# Patient Record
Sex: MALE | Race: WHITE | NOT HISPANIC OR LATINO | URBAN - METROPOLITAN AREA
[De-identification: names, ages, dates, MRNs, and addresses within clinical notes are randomized per-mention and may not be internally consistent; named-entity substitution may affect disease eponyms.]

---

## 2020-12-20 ENCOUNTER — OUTPATIENT (OUTPATIENT)
Dept: OUTPATIENT SERVICES | Facility: HOSPITAL | Age: 65
LOS: 1 days | End: 2020-12-20
Payer: COMMERCIAL

## 2020-12-20 DIAGNOSIS — Z20.828 CONTACT WITH AND (SUSPECTED) EXPOSURE TO OTHER VIRAL COMMUNICABLE DISEASES: ICD-10-CM

## 2020-12-20 PROCEDURE — U0003: CPT

## 2020-12-21 DIAGNOSIS — Z20.828 CONTACT WITH AND (SUSPECTED) EXPOSURE TO OTHER VIRAL COMMUNICABLE DISEASES: ICD-10-CM

## 2020-12-21 LAB — SARS-COV-2 RNA SPEC QL NAA+PROBE: SIGNIFICANT CHANGE UP

## 2021-09-23 ENCOUNTER — EMERGENCY (EMERGENCY)
Facility: HOSPITAL | Age: 66
LOS: 0 days | Discharge: LEFT BEFORE DISCHARGE | End: 2021-09-23
Attending: EMERGENCY MEDICINE
Payer: COMMERCIAL

## 2021-09-23 VITALS
HEART RATE: 157 BPM | TEMPERATURE: 99 F | OXYGEN SATURATION: 100 % | RESPIRATION RATE: 19 BRPM | HEIGHT: 70 IN | DIASTOLIC BLOOD PRESSURE: 97 MMHG | SYSTOLIC BLOOD PRESSURE: 143 MMHG | WEIGHT: 175.05 LBS

## 2021-09-23 VITALS
OXYGEN SATURATION: 99 % | HEART RATE: 99 BPM | TEMPERATURE: 99 F | DIASTOLIC BLOOD PRESSURE: 92 MMHG | SYSTOLIC BLOOD PRESSURE: 155 MMHG | RESPIRATION RATE: 18 BRPM

## 2021-09-23 DIAGNOSIS — R00.2 PALPITATIONS: ICD-10-CM

## 2021-09-23 DIAGNOSIS — Z20.822 CONTACT WITH AND (SUSPECTED) EXPOSURE TO COVID-19: ICD-10-CM

## 2021-09-23 DIAGNOSIS — I48.92 UNSPECIFIED ATRIAL FLUTTER: ICD-10-CM

## 2021-09-23 LAB
ALBUMIN SERPL ELPH-MCNC: 4.1 G/DL — SIGNIFICANT CHANGE UP (ref 3.3–5)
ALP SERPL-CCNC: 71 U/L — SIGNIFICANT CHANGE UP (ref 40–120)
ALT FLD-CCNC: 120 U/L — HIGH (ref 12–78)
ANION GAP SERPL CALC-SCNC: 6 MMOL/L — SIGNIFICANT CHANGE UP (ref 5–17)
APTT BLD: 30.9 SEC — SIGNIFICANT CHANGE UP (ref 27.5–35.5)
AST SERPL-CCNC: 54 U/L — HIGH (ref 15–37)
BASOPHILS # BLD AUTO: 0.06 K/UL — SIGNIFICANT CHANGE UP (ref 0–0.2)
BASOPHILS NFR BLD AUTO: 0.7 % — SIGNIFICANT CHANGE UP (ref 0–2)
BILIRUB SERPL-MCNC: 0.6 MG/DL — SIGNIFICANT CHANGE UP (ref 0.2–1.2)
BUN SERPL-MCNC: 13 MG/DL — SIGNIFICANT CHANGE UP (ref 7–23)
CALCIUM SERPL-MCNC: 9.2 MG/DL — SIGNIFICANT CHANGE UP (ref 8.5–10.1)
CHLORIDE SERPL-SCNC: 103 MMOL/L — SIGNIFICANT CHANGE UP (ref 96–108)
CO2 SERPL-SCNC: 27 MMOL/L — SIGNIFICANT CHANGE UP (ref 22–31)
CREAT SERPL-MCNC: 0.89 MG/DL — SIGNIFICANT CHANGE UP (ref 0.5–1.3)
EOSINOPHIL # BLD AUTO: 0.14 K/UL — SIGNIFICANT CHANGE UP (ref 0–0.5)
EOSINOPHIL NFR BLD AUTO: 1.7 % — SIGNIFICANT CHANGE UP (ref 0–6)
GLUCOSE SERPL-MCNC: 109 MG/DL — HIGH (ref 70–99)
HCT VFR BLD CALC: 38.7 % — LOW (ref 39–50)
HGB BLD-MCNC: 13.5 G/DL — SIGNIFICANT CHANGE UP (ref 13–17)
IMM GRANULOCYTES NFR BLD AUTO: 0.9 % — SIGNIFICANT CHANGE UP (ref 0–1.5)
INR BLD: 1.02 RATIO — SIGNIFICANT CHANGE UP (ref 0.88–1.16)
LYMPHOCYTES # BLD AUTO: 1.78 K/UL — SIGNIFICANT CHANGE UP (ref 1–3.3)
LYMPHOCYTES # BLD AUTO: 22.1 % — SIGNIFICANT CHANGE UP (ref 13–44)
MAGNESIUM SERPL-MCNC: 2.1 MG/DL — SIGNIFICANT CHANGE UP (ref 1.6–2.6)
MCHC RBC-ENTMCNC: 32.1 PG — SIGNIFICANT CHANGE UP (ref 27–34)
MCHC RBC-ENTMCNC: 34.9 GM/DL — SIGNIFICANT CHANGE UP (ref 32–36)
MCV RBC AUTO: 92.1 FL — SIGNIFICANT CHANGE UP (ref 80–100)
MONOCYTES # BLD AUTO: 0.95 K/UL — HIGH (ref 0–0.9)
MONOCYTES NFR BLD AUTO: 11.8 % — SIGNIFICANT CHANGE UP (ref 2–14)
NEUTROPHILS # BLD AUTO: 5.05 K/UL — SIGNIFICANT CHANGE UP (ref 1.8–7.4)
NEUTROPHILS NFR BLD AUTO: 62.8 % — SIGNIFICANT CHANGE UP (ref 43–77)
PHOSPHATE SERPL-MCNC: 3.2 MG/DL — SIGNIFICANT CHANGE UP (ref 2.5–4.5)
PLATELET # BLD AUTO: 226 K/UL — SIGNIFICANT CHANGE UP (ref 150–400)
POTASSIUM SERPL-MCNC: 4 MMOL/L — SIGNIFICANT CHANGE UP (ref 3.5–5.3)
POTASSIUM SERPL-SCNC: 4 MMOL/L — SIGNIFICANT CHANGE UP (ref 3.5–5.3)
PROT SERPL-MCNC: 7.2 GM/DL — SIGNIFICANT CHANGE UP (ref 6–8.3)
PROTHROM AB SERPL-ACNC: 11.8 SEC — SIGNIFICANT CHANGE UP (ref 10.6–13.6)
RAPID RVP RESULT: DETECTED
RBC # BLD: 4.2 M/UL — SIGNIFICANT CHANGE UP (ref 4.2–5.8)
RBC # FLD: 12.9 % — SIGNIFICANT CHANGE UP (ref 10.3–14.5)
RV+EV RNA SPEC QL NAA+PROBE: DETECTED
SARS-COV-2 RNA SPEC QL NAA+PROBE: SIGNIFICANT CHANGE UP
SODIUM SERPL-SCNC: 136 MMOL/L — SIGNIFICANT CHANGE UP (ref 135–145)
TROPONIN I SERPL-MCNC: <0.015 NG/ML — SIGNIFICANT CHANGE UP (ref 0.01–0.04)
WBC # BLD: 8.05 K/UL — SIGNIFICANT CHANGE UP (ref 3.8–10.5)
WBC # FLD AUTO: 8.05 K/UL — SIGNIFICANT CHANGE UP (ref 3.8–10.5)

## 2021-09-23 PROCEDURE — 83735 ASSAY OF MAGNESIUM: CPT

## 2021-09-23 PROCEDURE — 80053 COMPREHEN METABOLIC PANEL: CPT

## 2021-09-23 PROCEDURE — 71045 X-RAY EXAM CHEST 1 VIEW: CPT

## 2021-09-23 PROCEDURE — 96374 THER/PROPH/DIAG INJ IV PUSH: CPT

## 2021-09-23 PROCEDURE — 93005 ELECTROCARDIOGRAM TRACING: CPT

## 2021-09-23 PROCEDURE — 84100 ASSAY OF PHOSPHORUS: CPT

## 2021-09-23 PROCEDURE — 0225U NFCT DS DNA&RNA 21 SARSCOV2: CPT

## 2021-09-23 PROCEDURE — 99285 EMERGENCY DEPT VISIT HI MDM: CPT

## 2021-09-23 PROCEDURE — 85730 THROMBOPLASTIN TIME PARTIAL: CPT

## 2021-09-23 PROCEDURE — 71045 X-RAY EXAM CHEST 1 VIEW: CPT | Mod: 26

## 2021-09-23 PROCEDURE — 96361 HYDRATE IV INFUSION ADD-ON: CPT

## 2021-09-23 PROCEDURE — 96375 TX/PRO/DX INJ NEW DRUG ADDON: CPT

## 2021-09-23 PROCEDURE — 84443 ASSAY THYROID STIM HORMONE: CPT

## 2021-09-23 PROCEDURE — 85025 COMPLETE CBC W/AUTO DIFF WBC: CPT

## 2021-09-23 PROCEDURE — 36415 COLL VENOUS BLD VENIPUNCTURE: CPT

## 2021-09-23 PROCEDURE — 85610 PROTHROMBIN TIME: CPT

## 2021-09-23 PROCEDURE — 84484 ASSAY OF TROPONIN QUANT: CPT

## 2021-09-23 PROCEDURE — 93010 ELECTROCARDIOGRAM REPORT: CPT

## 2021-09-23 PROCEDURE — 99284 EMERGENCY DEPT VISIT MOD MDM: CPT | Mod: 25

## 2021-09-23 RX ORDER — APIXABAN 2.5 MG/1
5 TABLET, FILM COATED ORAL ONCE
Refills: 0 | Status: COMPLETED | OUTPATIENT
Start: 2021-09-23 | End: 2021-09-23

## 2021-09-23 RX ORDER — DILTIAZEM HCL 120 MG
10 CAPSULE, EXT RELEASE 24 HR ORAL ONCE
Refills: 0 | Status: COMPLETED | OUTPATIENT
Start: 2021-09-23 | End: 2021-09-23

## 2021-09-23 RX ORDER — SODIUM CHLORIDE 9 MG/ML
2000 INJECTION INTRAMUSCULAR; INTRAVENOUS; SUBCUTANEOUS ONCE
Refills: 0 | Status: COMPLETED | OUTPATIENT
Start: 2021-09-23 | End: 2021-09-23

## 2021-09-23 RX ORDER — ASPIRIN/CALCIUM CARB/MAGNESIUM 324 MG
324 TABLET ORAL ONCE
Refills: 0 | Status: COMPLETED | OUTPATIENT
Start: 2021-09-23 | End: 2021-09-23

## 2021-09-23 RX ORDER — DILTIAZEM HCL 120 MG
1 CAPSULE, EXT RELEASE 24 HR ORAL
Qty: 14 | Refills: 0
Start: 2021-09-23 | End: 2021-10-06

## 2021-09-23 RX ORDER — DILTIAZEM HCL 120 MG
120 CAPSULE, EXT RELEASE 24 HR ORAL ONCE
Refills: 0 | Status: COMPLETED | OUTPATIENT
Start: 2021-09-23 | End: 2021-09-23

## 2021-09-23 RX ORDER — APIXABAN 2.5 MG/1
1 TABLET, FILM COATED ORAL
Qty: 28 | Refills: 0
Start: 2021-09-23 | End: 2021-10-06

## 2021-09-23 RX ADMIN — SODIUM CHLORIDE 2000 MILLILITER(S): 9 INJECTION INTRAMUSCULAR; INTRAVENOUS; SUBCUTANEOUS at 17:16

## 2021-09-23 RX ADMIN — APIXABAN 5 MILLIGRAM(S): 2.5 TABLET, FILM COATED ORAL at 19:58

## 2021-09-23 RX ADMIN — Medication 10 MILLIGRAM(S): at 16:15

## 2021-09-23 RX ADMIN — Medication 10 MILLIGRAM(S): at 16:24

## 2021-09-23 RX ADMIN — SODIUM CHLORIDE 2000 MILLILITER(S): 9 INJECTION INTRAMUSCULAR; INTRAVENOUS; SUBCUTANEOUS at 16:15

## 2021-09-23 RX ADMIN — Medication 324 MILLIGRAM(S): at 16:14

## 2021-09-23 RX ADMIN — Medication 120 MILLIGRAM(S): at 16:38

## 2021-09-23 NOTE — ED PROVIDER NOTE - PATIENT PORTAL LINK FT
You can access the FollowMyHealth Patient Portal offered by [facility] by registering at the following website: [email or web address]. By joining [employer]’s FollowMyHealth portal, you will also be able to view your health information using other applications (apps) compatible with our system.

## 2021-09-23 NOTE — ED STATDOCS - PROGRESS NOTE DETAILS
Tracy Sparks: 64 y/o M with no significant PMHx presents to the ED c/o elevated HR. Last night went to get a rapid COVID test due to having a sore throat. Got his HR taken and got an EKG done that showed an abnormality. COVID test, result negative. PMD recommended pt to come in to the ED. Denies having any pain. Denies feeling lightheaded. No daily meds. No cardiac hx.  PMD: Dr. Hernandez.  Will send pt to main ED for further evaluation.

## 2021-09-23 NOTE — ED PROVIDER NOTE - PHYSICAL EXAMINATION
*GEN: No acute distress, well appearing   *HEAD: Normocephalic, Atraumatic  *EYES/NOSE: b/l Pupils symmetric & Reactive to ligth, EOMI b/l  *THROAT: airway patent, moist mucous membranes  *NECK: Neck supple  *PULMONARY: No Respiratory distress, symmetric b/l chest rise  *CARDIAC: s1s2, regular rhythm, tachycardic  *ABDOMEN:  Non Tender, Non Distended, soft, no guarding, no rebound, no masses   *BACK: no CVA tenderness, No midline vertebral tenderness to palpation   *EXTREMITIES: symmetric pulses, 2+ DP & radial pulses, no cyanosis, no edema   *SKIN: no rash, no bruising   *NEUROLOGIC: alert,  full active & passive ROM in all 4 extremities,   *PSYCH: appropriate concern about symptoms, pleasant

## 2021-09-23 NOTE — ED PROVIDER NOTE - NSFOLLOWUPINSTRUCTIONS_ED_ALL_ED_FT
FOLLOW UP WITH PMD & CARDIOLOGIST DR TAY  WITHIN 1-2DAYS, CALL TO MAKE APPOINTMENT  COME BACK TO ED IF YOUR CONDITION WORSENS OR IF YOU DEVELOP FEVER GREATER THAN 100.4F, CHEST PAIN,  SHORTNESS OF BREATH OR ANY OTHER SYMPTOMS CONCERNING TO YOU  TAKE TYLENOL (ACETAMINOPHEN) 650 MG EVERY 6 HOURS AS NEEDED FOR PAIN    IF YOU WERE PRESRCIBED ANY MEDICATIONS FROM TODAY'S VISIT, TAKE THEM AS DIRECTED (eliquis, cardizem)    A-fib (Atrial Fibrillation)    WHAT YOU NEED TO KNOW:    A-fib may come and go, or it may be a long-term condition. A-fib can cause blood clots, stroke, or heart failure. These conditions may become life-threatening. It is important to treat and manage a-fib to help prevent a blood clot, stroke, or heart failure. Heart Chambers         DISCHARGE INSTRUCTIONS:    Call 911 for any of the following:     You have any of the following signs of a heart attack:   Squeezing, pressure, or pain in your chest       and any of the following:   Discomfort or pain in your back, neck, jaw, stomach, or arm       Shortness of breath      Nausea or vomiting      Lightheadedness or a sudden cold sweat      You have any of the following signs of a stroke:   Numbness or drooping on one side of your face       Weakness in an arm or leg      Confusion or difficulty speaking      Dizziness, a severe headache, or vision loss    Return to the emergency department if: You have any of the following signs of a blood clot:     You feel lightheaded, are short of breath, and have chest pain.      You cough up blood.      You have swelling, redness, pain, or warmth in your arm or leg.    Contact your cardiologist or healthcare provider if:     Your heart rate is more than 110 beats per minute.      You have new or worsening swelling in your legs, feet, ankles, or abdomen.       You are short of breath, even at rest.       You have questions or concerns about your condition or care.     Medicines: You may need any of the following:     Heart medicines help control your heart rate or rhythm. You may need more than one medicine to treat your symptoms.       Blood thinners help prevent blood clots. Examples of blood thinners include heparin and warfarin. Clots can cause strokes, heart attacks, and death. The following are general safety guidelines to follow while you are taking a blood thinner:  Watch for bleeding and bruising while you take blood thinners. Watch for bleeding from your gums or nose. Watch for blood in your urine and bowel movements. Use a soft washcloth on your skin, and a soft toothbrush to brush your teeth. This can keep your skin and gums from bleeding. If you shave, use an electric shaver. Do not play contact sports.       Tell your dentist and other healthcare providers that you take anticoagulants. Wear a bracelet or necklace that says you take this medicine.       Do not start or stop any medicines unless your healthcare provider tells you to. Many medicines cannot be used with blood thinners.       Tell your healthcare provider right away if you forget to take the medicine, or if you take too much.      Warfarin is a blood thinner that you may need to take. The following are things you should be aware of if you take warfarin:   Foods and medicines can affect the amount of warfarin in your blood. Do not make major changes to your diet while you take warfarin. Warfarin works best when you eat about the same amount of vitamin K every day. Vitamin K is found in green leafy vegetables and certain other foods. Ask for more information about what to eat when you are taking warfarin.      You will need to see your healthcare provider for follow-up visits when you are on warfarin. You will need regular blood tests. These tests are used to decide how much medicine you need.       Antiplatelets, such as aspirin, help prevent blood clots. Take your antiplatelet medicine exactly as directed. These medicines make it more likely for you to bleed or bruise. If you are told to take aspirin, do not take acetaminophen or ibuprofen instead.       Take your medicine as directed. Contact your healthcare provider if you think your medicine is not helping or if you have side effects. Tell him or her if you are allergic to any medicine. Keep a list of the medicines, vitamins, and herbs you take. Include the amounts, and when and why you take them. Bring the list or the pill bottles to follow-up visits. Carry your medicine list with you in case of an emergency.    Follow up with your cardiologist as directed: You will need regular blood tests and monitoring. Write down your questions so you remember to ask them during your visits.     Manage A-fib:     Know your target heart rate. Learn how to take your pulse and monitor your heart rate.      Manage other health conditions. This includes high blood pressure, sleep apnea, thyroid disease, diabetes, and other heart conditions. Take medicine as directed and follow your treatment plan.       Limit or do not drink alcohol. Alcohol can make a-fib hard to manage. Ask your healthcare provider if it is safe for you to drink alcohol. A drink of alcohol is 12 ounces of beer, 5 ounces of wine, or 1½ ounces of liquor.       Do not smoke. Nicotine and other chemicals in cigarettes and cigars can cause heart and lung damage. Ask your healthcare provider for information if you currently smoke and need help to quit. E-cigarettes or smokeless tobacco still contain nicotine. Talk to your healthcare provider before you use these products.       Eat heart-healthy foods. Heart healthy foods will help keep your cholesterol low. These include fruits, vegetables, whole-grain breads, low-fat dairy products, beans, lean meats, and fish. Replace butter and margarine with heart-healthy oils such as olive oil and canola oil.       Maintain a healthy weight. Ask your healthcare provider how much you should weigh. Ask him or her to help you create a weight loss plan if you are overweight.       Exercise for 30 minutes most days of the week. Ask your healthcare provider about the best exercise plan for you.

## 2021-09-23 NOTE — ED PROVIDER NOTE - OBJECTIVE STATEMENT
Pertinent HPI/PMH/PSH/FHx/SHx and Review of Systems contained within  HPI:  Patient p/w CC  palpitations x 1 days, new onset. ekg yesterday at  showed aflutter, today showed svt.  Last night went to get a rapid COVID test due to having a sore throat. Got his HR taken and got an EKG done that showed an abnormality. COVID test, result negative. PMD recommended pt to come in to the ED. Denies having any pain. Denies feeling lightheaded. No daily meds. No cardiac hx.  PMD: Dr. Hernandez  PMH/PSH relevant for: none  ROS negative for: fever, Chest pain, SOB, Nausea, vomiting, diarrhea, abdominal pain, dysuria    FamilyHx and SocialHx not otherwise contributory

## 2021-09-23 NOTE — ED PROVIDER NOTE - NS ED ROS FT
Review of Systems:  	•	CONSTITUTIONAL: no fever  	•	SKIN: no rash  	•	RESPIRATORY: no shortness of breath  	•	CARDIAC: no chest pain, + palpitations  	•	GI:  no abd pain, no nausea, no vomiting, no diarrhea  	•	GENITO-URINARY:  no dysuria  	•	MUSCULOSKELETAL:  no back pain  	•	NEUROLOGIC: no weakness  	•	ALLERGY: no rhinorrhea  	•	PSYSCHIATRIC: appropriate concern about symptoms

## 2021-09-23 NOTE — ED ADULT NURSE REASSESSMENT NOTE - NS ED NURSE REASSESS COMMENT FT1
pt with new onset atrial fibrillation. plan of care recommended by MD Humphries is for pt to be admitted to hospital. pt does not wish to be admitted. states he would like to go home and will follow up with cardiologist. pt is A&Ox4, demonstrates competence to make medical decisions. verbalizes understanding of risks and that he may return to ED at anytime. pt educated on follow up care and outpatient medications.

## 2021-09-23 NOTE — ED PROVIDER NOTE - CARE PROVIDER_API CALL
Alexandre Oseguera)  Cardiovascular Disease; Internal Medicine; Nuclear Cardiology  [address], [address]  [place] [address]  Phone: [phone]  Fax: [phone]  Follow Up Time:     Priyank Yuan)  Cardiology; Interventional Cardiology  [address] [place] [address]  Phone: [phone]  Fax: [phone]  Follow Up Time:

## 2021-09-23 NOTE — ED PROVIDER NOTE - PROGRESS NOTE DETAILS
Tracy MCMILLAN for ED attending, Dr. Humphries: labs non-actionable, pt adamantly does not want to stay in hospital. Pt is a  has decision making capacity states he will f/u cardio outpatient agrees to second troponin understands risk/benefits. Will discuss outpatient management w/ on call cardio. Tracy MCMILLAN for ED attending, Dr. Humphries: Discussed w/ Dr. Yuan who recommends putting pt on Eliquis and [id number] qd. minerva: At the time of discharge this patient demonstrated full faculties medical capacity and judgement.  In my conversation with this patient they demonstrated the followin. This patient demonstrated their ability to express and communicate their choice to leave the emergency department against medical advice and to refuse further medical care.  2. This patient demonstrated the ability to understand relevant information regarding their diagnosis including the purpose of recommended treatment for their stated medical condition.  The paitent remembered this information and demonstrated that they were part of the decision making process in the cousre of their care.  3. This patient appreciated the significance of their medical condition, their diagnosis, and the consequences for leaving the emergency department against medical advice and refusing further medical treatment.  This patient demonstrated clear understanding of the benefits of further evaluation and treatment.  This patient demonstrated clear understanding of the risks of leaving against medical advice and refusing further medical treatment that include injury, illness, permenant disability, and death.   4.  This patient demonstrated the ability to manipulate inforamtion regarding their medical condition, their decision to leave the emergency department against medical advice, and their decision to refuse further medical care.  The patient demonstrated appropriate reasoning and intact logical thought processes during our conversation and was able to weigh the risks and benefits of receiving further medical care.

## 2021-09-23 NOTE — ED ADULT NURSE NOTE - OBJECTIVE STATEMENT
pt arrives to ED complaining of palpitations. pt sent in by his MD for tachycardia. denies medical history. denies chest pain, sob. alert and oriented x 4.

## 2021-09-23 NOTE — ED ADULT NURSE REASSESSMENT NOTE - NS ED NURSE REASSESS COMMENT FT1
pt recommended admission into hospital for new onset afib. pt denies admission. pt was explained to risks of leaving with new onset afib by MD Humphries and nurse. risks include blood clot, cardiac arrest, and death. pt understands these risks and still wants to leave.

## 2021-09-23 NOTE — ED ADULT NURSE NOTE - NSIMPLEMENTINTERV_GEN_ALL_ED
Implemented All Universal Safety Interventions:  [place] to call system. Call bell, personal items and telephone within reach. Instruct patient to call for assistance. Room bathroom lighting operational. Non-slip footwear when patient is off stretcher. Physically safe environment: no spills, clutter or unnecessary equipment. Stretcher in lowest position, wheels locked, appropriate side rails in place.

## 2022-10-10 NOTE — ED ADULT NURSE NOTE - PAIN RATING/NUMBER SCALE (0-10): ACTIVITY
[FreeTextEntry3] : IJustino MD, personally saw and evaluated the patient and developed the plan as documented above. I concur or have edited the note as appropriate.
0

## 2023-11-10 PROBLEM — Z00.00 ENCOUNTER FOR PREVENTIVE HEALTH EXAMINATION: Status: ACTIVE | Noted: 2023-11-10

## 2023-12-08 ENCOUNTER — NON-APPOINTMENT (OUTPATIENT)
Age: 68
End: 2023-12-08

## 2023-12-08 ENCOUNTER — APPOINTMENT (OUTPATIENT)
Dept: ORTHOPEDIC SURGERY | Facility: CLINIC | Age: 68
End: 2023-12-08
Payer: MEDICARE

## 2023-12-08 VITALS
SYSTOLIC BLOOD PRESSURE: 170 MMHG | HEIGHT: 75 IN | DIASTOLIC BLOOD PRESSURE: 101 MMHG | HEART RATE: 55 BPM | BODY MASS INDEX: 24.25 KG/M2 | WEIGHT: 195 LBS

## 2023-12-08 DIAGNOSIS — M16.11 UNILATERAL PRIMARY OSTEOARTHRITIS, RIGHT HIP: ICD-10-CM

## 2023-12-08 PROCEDURE — 99245 OFF/OP CONSLTJ NEW/EST HI 55: CPT

## 2023-12-08 PROCEDURE — 73502 X-RAY EXAM HIP UNI 2-3 VIEWS: CPT | Mod: RT

## 2023-12-08 PROCEDURE — 99205 OFFICE O/P NEW HI 60 MIN: CPT

## 2023-12-11 ENCOUNTER — APPOINTMENT (OUTPATIENT)
Dept: ORTHOPEDIC SURGERY | Facility: CLINIC | Age: 68
End: 2023-12-11

## 2024-01-25 ENCOUNTER — OUTPATIENT (OUTPATIENT)
Dept: OUTPATIENT SERVICES | Facility: HOSPITAL | Age: 69
LOS: 1 days | End: 2024-01-25
Payer: MEDICARE

## 2024-01-25 VITALS
OXYGEN SATURATION: 99 % | WEIGHT: 209 LBS | TEMPERATURE: 98 F | HEIGHT: 75 IN | HEART RATE: 60 BPM | RESPIRATION RATE: 14 BRPM | SYSTOLIC BLOOD PRESSURE: 130 MMHG | DIASTOLIC BLOOD PRESSURE: 80 MMHG

## 2024-01-25 DIAGNOSIS — M16.11 UNILATERAL PRIMARY OSTEOARTHRITIS, RIGHT HIP: ICD-10-CM

## 2024-01-25 DIAGNOSIS — Z98.890 OTHER SPECIFIED POSTPROCEDURAL STATES: Chronic | ICD-10-CM

## 2024-01-25 DIAGNOSIS — Z01.818 ENCOUNTER FOR OTHER PREPROCEDURAL EXAMINATION: ICD-10-CM

## 2024-01-25 LAB
A1C WITH ESTIMATED AVERAGE GLUCOSE RESULT: 5.2 % — SIGNIFICANT CHANGE UP (ref 4–5.6)
ALBUMIN SERPL ELPH-MCNC: 4.1 G/DL — SIGNIFICANT CHANGE UP (ref 3.3–5)
ALP SERPL-CCNC: 51 U/L — SIGNIFICANT CHANGE UP (ref 30–120)
ALT FLD-CCNC: 25 U/L — SIGNIFICANT CHANGE UP (ref 10–60)
ANION GAP SERPL CALC-SCNC: 9 MMOL/L — SIGNIFICANT CHANGE UP (ref 5–17)
APTT BLD: 49.8 SEC — HIGH (ref 24.5–35.6)
AST SERPL-CCNC: 25 U/L — SIGNIFICANT CHANGE UP (ref 10–40)
BILIRUB SERPL-MCNC: 0.7 MG/DL — SIGNIFICANT CHANGE UP (ref 0.2–1.2)
BLD GP AB SCN SERPL QL: SIGNIFICANT CHANGE UP
BUN SERPL-MCNC: 11 MG/DL — SIGNIFICANT CHANGE UP (ref 7–23)
CALCIUM SERPL-MCNC: 9.6 MG/DL — SIGNIFICANT CHANGE UP (ref 8.4–10.5)
CHLORIDE SERPL-SCNC: 100 MMOL/L — SIGNIFICANT CHANGE UP (ref 96–108)
CO2 SERPL-SCNC: 26 MMOL/L — SIGNIFICANT CHANGE UP (ref 22–31)
CREAT SERPL-MCNC: 0.6 MG/DL — SIGNIFICANT CHANGE UP (ref 0.5–1.3)
EGFR: 104 ML/MIN/1.73M2 — SIGNIFICANT CHANGE UP
ESTIMATED AVERAGE GLUCOSE: 103 MG/DL — SIGNIFICANT CHANGE UP (ref 68–114)
GLUCOSE SERPL-MCNC: 115 MG/DL — HIGH (ref 70–99)
HCT VFR BLD CALC: 36.7 % — LOW (ref 39–50)
HGB BLD-MCNC: 13.1 G/DL — SIGNIFICANT CHANGE UP (ref 13–17)
INR BLD: 1.31 RATIO — HIGH (ref 0.85–1.18)
MCHC RBC-ENTMCNC: 32.8 PG — SIGNIFICANT CHANGE UP (ref 27–34)
MCHC RBC-ENTMCNC: 35.7 GM/DL — SIGNIFICANT CHANGE UP (ref 32–36)
MCV RBC AUTO: 92 FL — SIGNIFICANT CHANGE UP (ref 80–100)
MRSA PCR RESULT.: SIGNIFICANT CHANGE UP
NRBC # BLD: 0 /100 WBCS — SIGNIFICANT CHANGE UP (ref 0–0)
PLATELET # BLD AUTO: 196 K/UL — SIGNIFICANT CHANGE UP (ref 150–400)
POTASSIUM SERPL-MCNC: 4.9 MMOL/L — SIGNIFICANT CHANGE UP (ref 3.5–5.3)
POTASSIUM SERPL-SCNC: 4.9 MMOL/L — SIGNIFICANT CHANGE UP (ref 3.5–5.3)
PROT SERPL-MCNC: 7.2 G/DL — SIGNIFICANT CHANGE UP (ref 6–8.3)
PROTHROM AB SERPL-ACNC: 14.2 SEC — HIGH (ref 9.5–13)
RBC # BLD: 3.99 M/UL — LOW (ref 4.2–5.8)
RBC # FLD: 13.1 % — SIGNIFICANT CHANGE UP (ref 10.3–14.5)
S AUREUS DNA NOSE QL NAA+PROBE: DETECTED
SODIUM SERPL-SCNC: 135 MMOL/L — SIGNIFICANT CHANGE UP (ref 135–145)
WBC # BLD: 5.49 K/UL — SIGNIFICANT CHANGE UP (ref 3.8–10.5)
WBC # FLD AUTO: 5.49 K/UL — SIGNIFICANT CHANGE UP (ref 3.8–10.5)

## 2024-01-25 PROCEDURE — G0463: CPT

## 2024-01-25 NOTE — H&P PST ADULT - HISTORY OF PRESENT ILLNESS
This nena 67 y/o female who presents with progressively worsening right hip pain and stiffness for the past several monthes . pain exacerbates with walking , standing and stairs with pain scale 7/10 . scheduled for  This is a 67 y/o female who presents with progressively worsening right hip pain and stiffness for the past several months . pain exacerbates with walking , standing and stairs with pain scale 7/10 . scheduled for right hip replacement on 2/14/24

## 2024-01-25 NOTE — H&P PST ADULT - MS GEN HX ROS MEA POS PC
right hip/arthritis/joint swelling/joint pain right hip/arthritis/joint swelling/joint pain/stiffness

## 2024-01-25 NOTE — H&P PST ADULT - NSICDXPASTMEDICALHX_GEN_ALL_CORE_FT
PAST MEDICAL HISTORY:  HTN (hypertension)     Osteoarthritis of right hip     Paroxysmal atrial fibrillation

## 2024-01-25 NOTE — H&P PST ADULT - GASTROINTESTINAL
nontender/nondistended/normal active bowel sounds negative soft/nontender/nondistended/normal active bowel sounds

## 2024-01-25 NOTE — H&P PST ADULT - MUSCULOSKELETAL
right hip/decreased ROM due to pain/decreased strength details… right hip/no calf tenderness/decreased ROM due to pain/decreased strength

## 2024-01-25 NOTE — H&P PST ADULT - NSICDXFAMILYHX_GEN_ALL_CORE_FT
FAMILY HISTORY:  Father  Still living? No  FH: heart attack, Age at diagnosis: Age Unknown    Sibling  Still living? Yes, Estimated age: 61-70  Family history of CLL in remission, Age at diagnosis: Age Unknown

## 2024-01-25 NOTE — H&P PST ADULT - PROBLEM SELECTOR PLAN 1
scheduled for right hip replacement on 2/14/24  will obtain medical and cardiac clearance   pre op instructions on wash and medications. instructed to take propanolol on DOS   Follow up with cardiology on Xarelto instruction for upcoming surgery.

## 2024-01-26 RX ORDER — MUPIROCIN 20 MG/G
1 OINTMENT TOPICAL
Qty: 1 | Refills: 0
Start: 2024-01-26 | End: 2024-01-30

## 2024-02-14 ENCOUNTER — TRANSCRIPTION ENCOUNTER (OUTPATIENT)
Age: 69
End: 2024-02-14

## 2024-02-14 ENCOUNTER — APPOINTMENT (OUTPATIENT)
Dept: ORTHOPEDIC SURGERY | Facility: HOSPITAL | Age: 69
End: 2024-02-14

## 2024-02-14 ENCOUNTER — INPATIENT (INPATIENT)
Facility: HOSPITAL | Age: 69
LOS: 0 days | Discharge: HOME | DRG: 470 | End: 2024-02-15
Attending: ORTHOPAEDIC SURGERY | Admitting: ORTHOPAEDIC SURGERY
Payer: COMMERCIAL

## 2024-02-14 VITALS
HEIGHT: 75 IN | OXYGEN SATURATION: 100 % | TEMPERATURE: 98 F | HEART RATE: 77 BPM | WEIGHT: 204.81 LBS | DIASTOLIC BLOOD PRESSURE: 89 MMHG | SYSTOLIC BLOOD PRESSURE: 167 MMHG | RESPIRATION RATE: 16 BRPM

## 2024-02-14 DIAGNOSIS — Z98.890 OTHER SPECIFIED POSTPROCEDURAL STATES: Chronic | ICD-10-CM

## 2024-02-14 DIAGNOSIS — M16.11 UNILATERAL PRIMARY OSTEOARTHRITIS, RIGHT HIP: ICD-10-CM

## 2024-02-14 PROBLEM — I48.0 PAROXYSMAL ATRIAL FIBRILLATION: Chronic | Status: ACTIVE | Noted: 2024-01-25

## 2024-02-14 LAB
ABO RH CONFIRMATION: SIGNIFICANT CHANGE UP
APTT BLD: 33 SEC — SIGNIFICANT CHANGE UP (ref 24.5–35.6)
INR BLD: 1.06 RATIO — SIGNIFICANT CHANGE UP (ref 0.85–1.18)
PROTHROM AB SERPL-ACNC: 11.5 SEC — SIGNIFICANT CHANGE UP (ref 9.5–13)

## 2024-02-14 PROCEDURE — 99221 1ST HOSP IP/OBS SF/LOW 40: CPT

## 2024-02-14 PROCEDURE — 27130 TOTAL HIP ARTHROPLASTY: CPT | Mod: RT

## 2024-02-14 PROCEDURE — 27130 TOTAL HIP ARTHROPLASTY: CPT | Mod: AS,RT

## 2024-02-14 DEVICE — BONE WAX 2.5GM: Type: IMPLANTABLE DEVICE | Site: RIGHT | Status: FUNCTIONAL

## 2024-02-14 DEVICE — K-WIRE MICROAIRE (THREADED) SINGLE TROCAR 4.8MM X 9": Type: IMPLANTABLE DEVICE | Site: RIGHT | Status: FUNCTIONAL

## 2024-02-14 DEVICE — CUP ACET EMPOWR CLUSTER 62MM ALPHA I: Type: IMPLANTABLE DEVICE | Site: RIGHT | Status: FUNCTIONAL

## 2024-02-14 DEVICE — SCREW ACET EMPOWR 6.5X35MM: Type: IMPLANTABLE DEVICE | Site: RIGHT | Status: FUNCTIONAL

## 2024-02-14 DEVICE — LINER ACET EMPOWR HXE PLUS 4X40MM ALPHA I: Type: IMPLANTABLE DEVICE | Site: RIGHT | Status: FUNCTIONAL

## 2024-02-14 DEVICE — IMPLANTABLE DEVICE: Type: IMPLANTABLE DEVICE | Site: RIGHT | Status: FUNCTIONAL

## 2024-02-14 DEVICE — SLEEVE BIOLOX DELTA OPTION NEUTRAL: Type: IMPLANTABLE DEVICE | Site: RIGHT | Status: FUNCTIONAL

## 2024-02-14 DEVICE — HEAD FEM OPTION CERAMIC DELTA 40MM: Type: IMPLANTABLE DEVICE | Site: RIGHT | Status: FUNCTIONAL

## 2024-02-14 RX ORDER — POLYETHYLENE GLYCOL 3350 17 G/17G
17 POWDER, FOR SOLUTION ORAL AT BEDTIME
Refills: 0 | Status: DISCONTINUED | OUTPATIENT
Start: 2024-02-14 | End: 2024-02-15

## 2024-02-14 RX ORDER — HYDROMORPHONE HYDROCHLORIDE 2 MG/ML
0.2 INJECTION INTRAMUSCULAR; INTRAVENOUS; SUBCUTANEOUS
Refills: 0 | Status: DISCONTINUED | OUTPATIENT
Start: 2024-02-14 | End: 2024-02-14

## 2024-02-14 RX ORDER — ACETAMINOPHEN 500 MG
1000 TABLET ORAL ONCE
Refills: 0 | Status: COMPLETED | OUTPATIENT
Start: 2024-02-14 | End: 2024-02-14

## 2024-02-14 RX ORDER — CELECOXIB 200 MG/1
1 CAPSULE ORAL
Qty: 60 | Refills: 0
Start: 2024-02-14 | End: 2024-03-14

## 2024-02-14 RX ORDER — GLYCERIN 1 %
1 DROPS OPHTHALMIC (EYE) EVERY 6 HOURS
Refills: 0 | Status: DISCONTINUED | OUTPATIENT
Start: 2024-02-14 | End: 2024-02-15

## 2024-02-14 RX ORDER — CHLORHEXIDINE GLUCONATE 213 G/1000ML
1 SOLUTION TOPICAL ONCE
Refills: 0 | Status: COMPLETED | OUTPATIENT
Start: 2024-02-14 | End: 2024-02-14

## 2024-02-14 RX ORDER — APIXABAN 2.5 MG/1
2.5 TABLET, FILM COATED ORAL EVERY 12 HOURS
Refills: 0 | Status: DISCONTINUED | OUTPATIENT
Start: 2024-02-15 | End: 2024-02-15

## 2024-02-14 RX ORDER — PROPRANOLOL HCL 160 MG
1 CAPSULE, EXTENDED RELEASE 24HR ORAL
Refills: 0 | DISCHARGE

## 2024-02-14 RX ORDER — CELECOXIB 200 MG/1
100 CAPSULE ORAL EVERY 12 HOURS
Refills: 0 | Status: DISCONTINUED | OUTPATIENT
Start: 2024-02-14 | End: 2024-02-15

## 2024-02-14 RX ORDER — ACETAMINOPHEN 500 MG
1000 TABLET ORAL EVERY 8 HOURS
Refills: 0 | Status: DISCONTINUED | OUTPATIENT
Start: 2024-02-14 | End: 2024-02-15

## 2024-02-14 RX ORDER — SODIUM CHLORIDE 9 MG/ML
500 INJECTION INTRAMUSCULAR; INTRAVENOUS; SUBCUTANEOUS ONCE
Refills: 0 | Status: COMPLETED | OUTPATIENT
Start: 2024-02-14 | End: 2024-02-14

## 2024-02-14 RX ORDER — CEFAZOLIN SODIUM 1 G
2000 VIAL (EA) INJECTION EVERY 8 HOURS
Refills: 0 | Status: COMPLETED | OUTPATIENT
Start: 2024-02-14 | End: 2024-02-14

## 2024-02-14 RX ORDER — HYDROMORPHONE HYDROCHLORIDE 2 MG/ML
0.5 INJECTION INTRAMUSCULAR; INTRAVENOUS; SUBCUTANEOUS
Refills: 0 | Status: DISCONTINUED | OUTPATIENT
Start: 2024-02-14 | End: 2024-02-15

## 2024-02-14 RX ORDER — OXYCODONE HYDROCHLORIDE 5 MG/1
1 TABLET ORAL
Qty: 42 | Refills: 0
Start: 2024-02-14

## 2024-02-14 RX ORDER — PANTOPRAZOLE SODIUM 20 MG/1
40 TABLET, DELAYED RELEASE ORAL
Refills: 0 | Status: DISCONTINUED | OUTPATIENT
Start: 2024-02-14 | End: 2024-02-15

## 2024-02-14 RX ORDER — OXYCODONE HYDROCHLORIDE 5 MG/1
10 TABLET ORAL
Refills: 0 | Status: DISCONTINUED | OUTPATIENT
Start: 2024-02-14 | End: 2024-02-15

## 2024-02-14 RX ORDER — DEXAMETHASONE 0.5 MG/5ML
8 ELIXIR ORAL ONCE
Refills: 0 | Status: COMPLETED | OUTPATIENT
Start: 2024-02-15 | End: 2024-02-15

## 2024-02-14 RX ORDER — APIXABAN 2.5 MG/1
1 TABLET, FILM COATED ORAL
Qty: 28 | Refills: 0
Start: 2024-02-14 | End: 2024-02-27

## 2024-02-14 RX ORDER — ONDANSETRON 8 MG/1
4 TABLET, FILM COATED ORAL EVERY 6 HOURS
Refills: 0 | Status: DISCONTINUED | OUTPATIENT
Start: 2024-02-14 | End: 2024-02-15

## 2024-02-14 RX ORDER — OMEPRAZOLE 10 MG/1
1 CAPSULE, DELAYED RELEASE ORAL
Qty: 30 | Refills: 0
Start: 2024-02-14

## 2024-02-14 RX ORDER — SENNA PLUS 8.6 MG/1
2 TABLET ORAL AT BEDTIME
Refills: 0 | Status: DISCONTINUED | OUTPATIENT
Start: 2024-02-14 | End: 2024-02-15

## 2024-02-14 RX ORDER — POLYETHYLENE GLYCOL 3350 17 G/17G
17 POWDER, FOR SOLUTION ORAL
Qty: 0 | Refills: 0 | DISCHARGE
Start: 2024-02-14

## 2024-02-14 RX ORDER — APIXABAN 2.5 MG/1
5 TABLET, FILM COATED ORAL EVERY 12 HOURS
Refills: 0 | Status: DISCONTINUED | OUTPATIENT
Start: 2024-02-16 | End: 2024-02-15

## 2024-02-14 RX ORDER — ACETAMINOPHEN 500 MG
2 TABLET ORAL
Qty: 0 | Refills: 0 | DISCHARGE
Start: 2024-02-14

## 2024-02-14 RX ORDER — SENNA PLUS 8.6 MG/1
2 TABLET ORAL
Qty: 0 | Refills: 0 | DISCHARGE
Start: 2024-02-14

## 2024-02-14 RX ORDER — TRANEXAMIC ACID 100 MG/ML
1000 INJECTION, SOLUTION INTRAVENOUS ONCE
Refills: 0 | Status: COMPLETED | OUTPATIENT
Start: 2024-02-14 | End: 2024-02-14

## 2024-02-14 RX ORDER — CEFAZOLIN SODIUM 1 G
2000 VIAL (EA) INJECTION ONCE
Refills: 0 | Status: COMPLETED | OUTPATIENT
Start: 2024-02-14 | End: 2024-02-14

## 2024-02-14 RX ORDER — SODIUM CHLORIDE 9 MG/ML
1000 INJECTION, SOLUTION INTRAVENOUS
Refills: 0 | Status: DISCONTINUED | OUTPATIENT
Start: 2024-02-14 | End: 2024-02-14

## 2024-02-14 RX ORDER — HYDROMORPHONE HYDROCHLORIDE 2 MG/ML
0.5 INJECTION INTRAMUSCULAR; INTRAVENOUS; SUBCUTANEOUS
Refills: 0 | Status: DISCONTINUED | OUTPATIENT
Start: 2024-02-14 | End: 2024-02-14

## 2024-02-14 RX ORDER — APREPITANT 80 MG/1
40 CAPSULE ORAL ONCE
Refills: 0 | Status: COMPLETED | OUTPATIENT
Start: 2024-02-14 | End: 2024-02-14

## 2024-02-14 RX ORDER — SODIUM CHLORIDE 9 MG/ML
1000 INJECTION, SOLUTION INTRAVENOUS
Refills: 0 | Status: DISCONTINUED | OUTPATIENT
Start: 2024-02-14 | End: 2024-02-15

## 2024-02-14 RX ORDER — MAGNESIUM HYDROXIDE 400 MG/1
30 TABLET, CHEWABLE ORAL DAILY
Refills: 0 | Status: DISCONTINUED | OUTPATIENT
Start: 2024-02-14 | End: 2024-02-15

## 2024-02-14 RX ORDER — APIXABAN 2.5 MG/1
1 TABLET, FILM COATED ORAL
Qty: 2 | Refills: 0
Start: 2024-02-14 | End: 2024-02-14

## 2024-02-14 RX ORDER — RIVAROXABAN 15 MG-20MG
1 KIT ORAL
Qty: 0 | Refills: 0 | DISCHARGE

## 2024-02-14 RX ORDER — OXYCODONE HYDROCHLORIDE 5 MG/1
5 TABLET ORAL
Refills: 0 | Status: DISCONTINUED | OUTPATIENT
Start: 2024-02-14 | End: 2024-02-15

## 2024-02-14 RX ORDER — TOBRAMYCIN 0.3 %
1 DROPS OPHTHALMIC (EYE) EVERY 4 HOURS
Refills: 0 | Status: COMPLETED | OUTPATIENT
Start: 2024-02-14 | End: 2024-02-14

## 2024-02-14 RX ADMIN — CHLORHEXIDINE GLUCONATE 1 APPLICATION(S): 213 SOLUTION TOPICAL at 06:46

## 2024-02-14 RX ADMIN — Medication 1000 MILLIGRAM(S): at 21:37

## 2024-02-14 RX ADMIN — SODIUM CHLORIDE 500 MILLILITER(S): 9 INJECTION INTRAMUSCULAR; INTRAVENOUS; SUBCUTANEOUS at 10:28

## 2024-02-14 RX ADMIN — Medication 100 MILLIGRAM(S): at 23:32

## 2024-02-14 RX ADMIN — Medication 1 DROP(S): at 17:31

## 2024-02-14 RX ADMIN — Medication 1000 MILLIGRAM(S): at 16:44

## 2024-02-14 RX ADMIN — SODIUM CHLORIDE 100 MILLILITER(S): 9 INJECTION, SOLUTION INTRAVENOUS at 17:17

## 2024-02-14 RX ADMIN — SODIUM CHLORIDE 75 MILLILITER(S): 9 INJECTION, SOLUTION INTRAVENOUS at 10:28

## 2024-02-14 RX ADMIN — Medication 100 MILLIGRAM(S): at 16:15

## 2024-02-14 RX ADMIN — Medication 1000 MILLIGRAM(S): at 21:40

## 2024-02-14 RX ADMIN — Medication 1 DROP(S): at 21:37

## 2024-02-14 RX ADMIN — APREPITANT 40 MILLIGRAM(S): 80 CAPSULE ORAL at 06:46

## 2024-02-14 RX ADMIN — Medication 1 DROP(S): at 14:00

## 2024-02-14 RX ADMIN — SODIUM CHLORIDE 500 MILLILITER(S): 9 INJECTION INTRAMUSCULAR; INTRAVENOUS; SUBCUTANEOUS at 17:16

## 2024-02-14 RX ADMIN — Medication 400 MILLIGRAM(S): at 16:14

## 2024-02-14 RX ADMIN — SENNA PLUS 2 TABLET(S): 8.6 TABLET ORAL at 21:36

## 2024-02-14 NOTE — OCCUPATIONAL THERAPY INITIAL EVALUATION ADULT - LIVES WITH, PROFILE
Pt lives with his wife in a private home, 3-4 steps to enter, 0 steps inside with a tub. Pt was independent with ADLs, IADLs, functional mobility/transfers prior to admission without AD./spouse

## 2024-02-14 NOTE — CONSULT NOTE ADULT - SUBJECTIVE AND OBJECTIVE BOX
68M with R hip OA s/p R THR    Pt was seen postoperatively  Reported manageable pain   Denied nausea, CP, SOB or HA        REVIEW OF SYSTEMS:  CONSTITUTIONAL: No fever, weight loss, or fatigue    EYES: No eye pain, visual disturbances, or discharge    ENMT:  No difficulty hearing, tinnitus, vertigo; No sinus or throat pain    NECK: No pain or stiffness    RESPIRATORY: No cough, wheezing, chills or hemoptysis; No shortness of breath    CARDIOVASCULAR: No chest pain, palpitations, dizziness, or leg swelling    GASTROINTESTINAL: No abdominal or epigastric pain. No nausea, vomiting, or hematemesis; No diarrhea or constipation. No melena or hematochezia.    NEUROLOGICAL: No headaches, memory loss, loss of strength, numbness, or tremors    SKIN: No itching, burning, rashes, or lesions     LYMPH NODES: No enlarged glands    ENDOCRINE: No heat or cold intolerance; No hair loss    PSYCHIATRIC: No depression, anxiety, mood swings, or difficulty sleeping    HEME/LYMPH: No easy bruising, or bleeding gums    ALLERGY AND IMMUNOLOGIC: No hives or eczema      PAST MEDICAL & SURGICAL HISTORY:  HTN (hypertension)      Paroxysmal atrial fibrillation      Osteoarthritis of right hip      S/P bilateral inguinal hernia repair          SOCIAL HISTORY:  Residence: [ ] [facility]  [ ]   [ ] Community  [ ] Substance abuse:   [ ] Tobacco:   [ ] Alcohol use:     Allergies    No Known Allergies    Intolerances        MEDICATIONS  (STANDING):  celecoxib 100 milliGRAM(s) Oral every 12 hours  lactated ringers. 1000 milliLiter(s) (75 mL/Hr) IV Continuous <Continuous>    MEDICATIONS  (PRN):  HYDROmorphone  Injectable 0.2 milliGRAM(s) IV Push every 10 minutes PRN Moderate Pain (4 - 6)  HYDROmorphone  Injectable 0.5 milliGRAM(s) IV Push every 10 minutes PRN Severe Pain (7 - 10)      FAMILY HISTORY:  FH: heart attack (Father)    Family history of CLL in remission (Sibling)        Vital Signs Last 24 Hrs  T(C): 36.4 (14 Feb 2024 09:55), Max: 36.6 (14 Feb 2024 06:24)  T(F): 97.6 (14 Feb 2024 09:55), Max: 97.8 (14 Feb 2024 06:24)  HR: 71 (14 Feb 2024 11:40) (66 - 77)  BP: 144/78 (14 Feb 2024 11:40) (105/62 - 167/89)  BP(mean): --  RR: 17 (14 Feb 2024 11:40) (13 - 20)  SpO2: 99% (14 Feb 2024 11:40) (99% - 100%)    Parameters below as of 14 Feb 2024 10:25  Patient On (Oxygen Delivery Method): room air        PHYSICAL EXAM:  GENERAL: NAD   HEAD:  Atraumatic, Normocephalic    EYES: EOMI, PERRLA, conjunctiva and sclera clear    NERVOUS SYSTEM:  Alert & Oriented X3, Good concentration; Moving all 4 extremities; No gross sensory deficits    CHEST/LUNG: Clear to auscultation bilaterally; No rales, rhonchi, wheezing, or rubs    HEART: Regular rate and rhythm; No murmurs, rubs, or gallops    ABDOMEN: Soft, Nontender, Nondistended; Bowel sounds present    EXTREMITIES:  2+ Peripheral Pulses, No clubbing, cyanosis, or edema    INCISION R hip dressing clean and dry, neurovascularly intact     LABS:          PT/INR - ( 14 Feb 2024 06:20 )   PT: 11.5 sec;   INR: 1.06 ratio         PTT - ( 14 Feb 2024 06:20 )  PTT:33.0 sec    CAPILLARY BLOOD GLUCOSE          RADIOLOGY & ADDITIONAL STUDIES:    EKG:   Personally Reviewed:  [ ] YES     Imaging:   Personally Reviewed:  [ ] YES     Consultant(s) Notes Reviewed:      Care Discussed with Consultants/Other Providers:                68M with AF (on xarelto) and R hip OA s/p R THR    Pt was seen postoperatively in the PACU  Reported no pain, still with residual anesthesia  Denied nausea, CP, SOB or HA        REVIEW OF SYSTEMS:  CONSTITUTIONAL: No fever, weight loss, or fatigue    ENMT:  No difficulty hearing, tinnitus, vertigo; No sinus or throat pain    NECK: No pain or stiffness    RESPIRATORY: No cough, wheezing, chills or hemoptysis; No shortness of breath    CARDIOVASCULAR: No chest pain, palpitations, dizziness, or leg swelling    GASTROINTESTINAL: No abdominal or epigastric pain. No nausea, vomiting, or hematemesis; No diarrhea or constipation. No melena or hematochezia.    NEUROLOGICAL: No headaches, memory loss, loss of strength, numbness, or tremors    SKIN: No itching, burning, rashes, or lesions     LYMPH NODES: No enlarged glands    ENDOCRINE: No heat or cold intolerance; No hair loss    PSYCHIATRIC: No depression, anxiety, mood swings, or difficulty sleeping    HEME/LYMPH: No easy bruising, or bleeding gums    ALLERGY AND IMMUNOLOGIC: No hives or eczema      PAST MEDICAL & SURGICAL HISTORY:  HTN (hypertension)      Paroxysmal atrial fibrillation      Osteoarthritis of right hip      S/P bilateral inguinal hernia repair          SOCIAL HISTORY:  Residence: [ ] [facility]  [ ] [facility]  [ ] Community  [ ] Substance abuse:   [ ] Tobacco:   [ ] Alcohol use:     Allergies    No Known Allergies    Intolerances        MEDICATIONS  (STANDING):  celecoxib 100 milliGRAM(s) Oral every 12 hours  lactated ringers. 1000 milliLiter(s) (75 mL/Hr) IV Continuous <Continuous>    MEDICATIONS  (PRN):  HYDROmorphone  Injectable 0.2 milliGRAM(s) IV Push every 10 minutes PRN Moderate Pain (4 - 6)  HYDROmorphone  Injectable 0.5 milliGRAM(s) IV Push every 10 minutes PRN Severe Pain (7 - 10)      FAMILY HISTORY:  FH: heart attack (Father)    Family history of CLL in remission (Sibling)        Vital Signs Last 24 Hrs  T(C): 36.4 (14 Feb 2024 09:55), Max: 36.6 (14 Feb 2024 06:24)  T(F): 97.6 (14 Feb 2024 09:55), Max: 97.8 (14 Feb 2024 06:24)  HR: 71 (14 Feb 2024 11:40) (66 - 77)  BP: 144/78 (14 Feb 2024 11:40) (105/62 - 167/89)  BP(mean): --  RR: 17 (14 Feb 2024 11:40) (13 - 20)  SpO2: 99% (14 Feb 2024 11:40) (99% - 100%)    Parameters below as of 14 Feb 2024 10:25  Patient On (Oxygen Delivery Method): room air        PHYSICAL EXAM:  GENERAL: NAD   HEAD:  Atraumatic, Normocephalic    EYES: EOMI, PERRLA, conjunctiva and sclera clear    NERVOUS SYSTEM:  Alert & Oriented X3, Good concentration; Moving all 4 extremities; No gross sensory deficits    CHEST/LUNG: Clear to auscultation bilaterally; No rales, rhonchi, wheezing, or rubs    HEART: Regular rate and rhythm; No murmurs, rubs, or gallops    ABDOMEN: Soft, Nontender, Nondistended; Bowel sounds present    EXTREMITIES:  2+ Peripheral Pulses, No clubbing, cyanosis, or edema    INCISION R hip dressing clean and dry     LABS:          PT/INR - ( 14 Feb 2024 06:20 )   PT: 11.5 sec;   INR: 1.06 ratio         PTT - ( 14 Feb 2024 06:20 )  PTT:33.0 sec    CAPILLARY BLOOD GLUCOSE          RADIOLOGY & ADDITIONAL STUDIES:    EKG:   Personally Reviewed:  [ ] YES     Imaging:   Personally Reviewed:  [ ] YES     Consultant(s) Notes Reviewed:      Care Discussed with Consultants/Other Providers:

## 2024-02-14 NOTE — DISCHARGE NOTE NURSING/CASE MANAGEMENT/SOCIAL WORK - PATIENT PORTAL LINK FT
You can access the FollowMyHealth Patient Portal offered by [facility] by registering at the following website: [email or web address]. By joining [employer]’s FollowMyHealth portal, you will also be able to view your health information using other applications (apps) compatible with our system.

## 2024-02-14 NOTE — BRIEF OPERATIVE NOTE - NSICDXBRIEFPOSTOP_GEN_ALL_CORE_FT
POST-OP DIAGNOSIS:  Primary localized osteoarthritis of right hip 14-Feb-2024 09:31:13  Louis Rizzo

## 2024-02-14 NOTE — ANESTHESIA FOLLOW-UP NOTE - NSEVALATIONFT_GEN_ALL_CORE
Patient states that he feels foreign body sensation in both eyes, Right eye worse than Left eye. No changes in vision. Tetracaine drops administered to both eyes, patient got relief. Tobramycin drops given, and explained to patient to add 1 drop in each eye every 4 hrs for two more doses. Also explained to patient that he most likely has a corneal abrasion and that it will most likely self resolve in the next 24-48 hrs.

## 2024-02-14 NOTE — DISCHARGE NOTE NURSING/CASE MANAGEMENT/SOCIAL WORK - NSSCNAMETXT_GEN_ALL_CORE
[facility] care agency [phone] will reach out to you within 24-72 hours of your discharge to schedule home care visit/eval appointment with you. Please call agency for any queries regarding home care services

## 2024-02-14 NOTE — PATIENT PROFILE ADULT - NSPROPTRIGHTCAREGIVER_GEN_A_NUR
Spoke to: Dr. Crews ()    Does the patient have any concerns, questions about post op instructions? Yes he asked about the anesthesia used for the JAMEL scheduled in April.. he states his wife has trouble waking up from the anesthesia due to her alzheimers.   Discussed usually outpatient procedure and more than likely will be using propofol for sedation.   He has no further questions in this matter.       Does the patient have any concerns, questions about wound site? No   Has the patient resumed medications and/or picked up new prescriptions ordered at discharge? yes    Does the patient have any other questions regarding their procedure? No    He reports no CP or SOB. States his wife is doing well otherwise just had a slow return to her baseline mentally.  No further questions or concerns.         yes

## 2024-02-14 NOTE — DISCHARGE NOTE PROVIDER - NSDCFUSCHEDAPPT_GEN_ALL_CORE_FT
Bertrand Frost  [place] [facility]  ORTHOSURG [address] [facility]  Scheduled Appointment: 02/14/2024    Bertrand Frost [facility]  ORTHOSURG [address]  Scheduled Appointment: 03/01/2024    Bertrand Frost [facility]  ORTHOSURG [address]  Scheduled Appointment: 04/12/2024     Bertrand Frost  [facility]  ORTHOSURG [address]  Scheduled Appointment: 03/01/2024    Bertrand Frost  [place]  ORTHOSUR [address]  Scheduled Appointment: 04/12/2024

## 2024-02-14 NOTE — DISCHARGE NOTE PROVIDER - NSDCCPCAREPLAN_GEN_ALL_CORE_FT
PRINCIPAL DISCHARGE DIAGNOSIS  Diagnosis: Primary localized osteoarthritis of right hip  Assessment and Plan of Treatment:      PRINCIPAL DISCHARGE DIAGNOSIS  Diagnosis: Primary localized osteoarthritis of right hip  Assessment and Plan of Treatment: right anterior MICHELLE  Physical Therapy/Occupational Therapy for: Ambulation, transfers, stairs, & ADLs, isometrics.   Full weight bearing on both legs; Walker/cane use as instructed by Physical therapy/Occupational therapy. Anterior Total Hip Replacement precautions for  6 weeks: No straight leg raise; No external rotation of hip when extended-standing or lying flat; No hyperextension of hip when standing (kickback).   Ice packs to hip for 30 min. every 3 hours and after physical therapy.   Keep incision clean and dry. You have a silverlon dressing. It is water resistant and may get slightly wet in the shower.  Remove dressing in 7 days and leave wound open to air.  Wound may get wet in the shower as long as there is no drainage from wound.  Prineo tape removal on/near after Post Op Day # 14 in Surgeon's office.  • Do not take a tub bath yet.   • Do not resume driving until you have your surgeon’s permission.  Opioid safety : Do not keep opioid in the medicine cabinet in bathroom or on kitchen counter where others may have access to it.  Do not drive while taking opioid.  To dispose of it some pharmacies do have drop boxes as do police stations.  Do not flush or put in trash.

## 2024-02-14 NOTE — BRIEF OPERATIVE NOTE - NSICDXBRIEFPREOP_GEN_ALL_CORE_FT
PRE-OP DIAGNOSIS:  Primary localized osteoarthritis of right hip 14-Feb-2024 09:31:05  Louis Rizzo

## 2024-02-14 NOTE — PHYSICAL THERAPY INITIAL EVALUATION ADULT - MANUAL MUSCLE TESTING RESULTS, REHAB EVAL
bilateral shoulder flex/ext/ER/IR/Abd 5/5, bilateral knee ext 4+/5, left ankle df/pf 5/5, right ankle pf 4+/5, right ankle df 3+/5/grossly assessed due to

## 2024-02-14 NOTE — DISCHARGE NOTE NURSING/CASE MANAGEMENT/SOCIAL WORK - NSDCFUADDAPPT_GEN_ALL_CORE_FT
Please follow up w/ Dr Frost at [facility].  It is advisable to follow up w/ your pcp in 2-3 weeks to be sure there are no underlying problems.

## 2024-02-14 NOTE — DISCHARGE NOTE PROVIDER - NSDCCPTREATMENT_GEN_ALL_CORE_FT
PRINCIPAL PROCEDURE  Procedure: Total hip replacement  Findings and Treatment: right anterior     PRINCIPAL PROCEDURE  Procedure: Total hip replacement  Findings and Treatment: right anterior  Your new total hip replacement requires proper care.  Your surgical care provider is your best resource for information.  Physical Therapy/Occupational Therapy for: Ambulation, transfers, stairs, & ADLs, isometrics.  Full weight bearing on both legs; Walker/cane use as instructed by Physical therapy/Occupational therapy.  Anterior Total Hip Replacement precautions for  6 weeks:  - No straight leg raise;  - No external rotation of hip when extended-standing or lying flat; No hyperextension of hip when standing (kickback).  - Use pain medication if needed as prescribed.  Ice packs are a helpful addition to your comfort.  Applying a  waterproof ice pack over a cloth or thin towel to the site for 30 minutes per hour may provide benefit by reducing swelling and discomfort.  - Keep incision clean and dry.   -Take short, frequent walks increasing the distance that you walk each day as tolerated.  - Change your position every hour to decrease pain and stiffness.  - Continue the exercises taught to you by your physical therapist.  - No driving until cleared by the doctor.  - No tub baths, hot tubs, or swimming pools until instructed by your doctor.  [place] dressing is over your hip wound.  It is waterproof and you may shower.  Do not aim shower stream at surgical site and pat dry with clean towel after showering.   Remove Silverlon dressing 7 days after surgery and leave wound open to air.  Prineo tape/suture removal on/near after Post Op Day # 14 in your Surgeon's office.       PRINCIPAL PROCEDURE  Procedure: Total hip replacement  Findings and Treatment: right anterior MICHELLE

## 2024-02-14 NOTE — CARE COORDINATION ASSESSMENT. - NSCAREPROVIDERS_GEN_ALL_CORE_FT
CARE PROVIDERS:  Administration: Silverio Han  Admitting: Bertrand Frost  Attending: Bertrand Frost  Consultant: Jim Patel  Covering Team: MINDA Bales  Nurse: Wilma Cole  Nurse: Zee Murguia  Nurse: Eliz Lo  Nurse: Cathie Nelson  Nurse: Bess Moya  Nurse: Angeli Ba  Nurse: aNrayan Palmer  Occupational Therapy: Nancy Leung  Override: Zee Murguia  Physical Therapy: Skylar Syed  Physical Therapy: Evin Ledbetter  Physical Therapy: Johnson Pritchard  Physical Therapy: Sharlene Hess  Physical Therapy: Darryl Mosher  Physical Therapy: Marlin Reich  Primary Team: Senthil Rothman  Primary Team: Janie Charles  Primary Team: Jorgito Wilkins  Primary Team: Patricia Hall  Primary Team: Louis Rizzo  Primary Team: Avila Milan  Registered Dietitian: Kathrin Gilmore  : Bess Eaton  Student: Juan Daniel Worley  Team: WINTER  Hospitalists, Team  UR// Supp. Assoc.: Ashley Aaron

## 2024-02-14 NOTE — PHYSICAL THERAPY INITIAL EVALUATION ADULT - PERTINENT HX OF CURRENT PROBLEM, REHAB EVAL
As per chart, this is a 67 y/o male who presents with progressively worsening right hip pain and stiffness for the past several months . pain exacerbates with walking , standing and stairs with pain scale 7/10 . s/p right total anterior hip replacement.

## 2024-02-14 NOTE — PHYSICAL THERAPY INITIAL EVALUATION ADULT - ADDITIONAL COMMENTS
pt lives in a house with his wife. Pt has 3-4STE w/ HR on both sides ->0 as bathroom and bedroom are al on first floor. Pt owns a RW, commode, and tub. Pt is retired and working part-time.

## 2024-02-14 NOTE — DISCHARGE NOTE PROVIDER - HOSPITAL COURSE
The patient is a 68 year old male with severe osteoarthritis of the right hip.  After admission on February 14, 2024 and receiving pre-operative parenteral prophylactic antibiotics, the patient  underwent an  uncomplicated right total hip replacement via anterior approach by orthopedic surgeon Dr. Bertrand Frost.    A medical consultation from the Hospitalist service was obtained for post-operative medical co-management. Typical Physical & occupational therapy modalities post anterior total hip replacement were performed including ambulation training, range of motion, ADL's, and transfers. Eliquis 2.5 mg twice daily on POD#1 then eliquis 5 mg twice daily for 13 more days was given for DVT prophylaxis.  The patient is then to resume his usual Xartelto dose.   The patient had a clean appearing surgical dressing with no sign of surgical site infections and had a stable neuro / vascular exam of the operated extremity.  After progression of mobility guided by the PT/ OT staff,  the patient was felt to benefit from further rehabilitative care to increase their level of function. This was felt to best be accomplished in a home setting.  Discharge and Orthopedic Care instructions were delineated in the Discharge Plan and reviewed with the patient. All medications were delineated in the medication reconciliation tool and key points were reviewed with the patient. They were deemed stable from an Orthopedic & medical standpoint for discharge today.  At 2 weeks postop they will be  following up with Dr. Frost for office  follow up Orthopedic care.   The patient is a 68 year old male with severe osteoarthritis of the right hip.  After admission on February 14, 2024 and receiving pre-operative parenteral prophylactic antibiotics, the patient  underwent an  uncomplicated right total hip replacement via anterior approach by orthopedic surgeon Dr. Bertrand Frost.    A medical consultation from the Hospitalist service was obtained for post-operative medical co-management. Typical Physical & occupational therapy modalities post anterior total hip replacement were performed including ambulation training, range of motion, ADL's, and transfers. Eliquis 2.5 mg twice daily on POD#1 then eliquis 5 mg twice daily for 13 more days was given for DVT prophylaxis.  The patient is then to resume his usual Xartelto dose.   The patient had a clean appearing surgical dressing with no sign of surgical site infections and had a stable neuro / vascular exam of the operated extremity.  After progression of mobility guided by the PT/ OT staff,  the patient was felt to benefit from further rehabilitative care to increase their level of function. This was felt to best be accomplished in a home setting.  Discharge and Orthopedic Care instructions were delineated in the Discharge Plan and reviewed with the patient. All medications were delineated in the medication reconciliation tool and key points were reviewed with the patient. They were deemed stable from an Orthopedic & medical standpoint for discharge today.  At 2 weeks postop they will be  following up with Dr. Frost for office  follow up Orthopedic care.  Patient is going home with home care/PT/OT, skilled nursing.

## 2024-02-14 NOTE — DISCHARGE NOTE PROVIDER - NSDCFUADDINST_GEN_ALL_CORE_FT
For Constipation :   • Increase your water intake. Drink at least 8 glasses of water daily.  • Try adding fiber to your diet by eating fruits, vegetables and foods that are rich in grains.  • If you do experience constipation, you may take an over-the-counter stool softener/laxative such as Luna Colace, [place] or  Milk of Magnesia.

## 2024-02-14 NOTE — OCCUPATIONAL THERAPY INITIAL EVALUATION ADULT - ORIENTATION, REHAB EVAL
See Dr Molina within 1 week in the office  See Dr Singh within 2 weeks in the office    Call both offices for appointments oriented to person, place, time and situation

## 2024-02-14 NOTE — CARE COORDINATION ASSESSMENT. - NSPASTMEDSURGHISTORY_GEN_ALL_CORE_FT
PAST MEDICAL & SURGICAL HISTORY:  Osteoarthritis of right hip      Paroxysmal atrial fibrillation      HTN (hypertension)      S/P bilateral inguinal hernia repair

## 2024-02-14 NOTE — DISCHARGE NOTE PROVIDER - NSDCFUADDAPPT_GEN_ALL_CORE_FT
Please follow up w/ Dr Frost at [facility].  It is advisable to follow up w/ your pcp in 2-3 weeks to be sure there are no underlying problems.

## 2024-02-14 NOTE — PATIENT CHOICE NOTE. - NSPTCHOICESTATE_GEN_ALL_CORE

## 2024-02-14 NOTE — PATIENT PROFILE ADULT - MST SCORE
PROCEDURE:  CHEST RADIOGRAPH, 1 VIEW



HISTORY:

weakness, intermittently productive cough



COMPARISON:

02/28/2018



FINDINGS:



LUNGS:

Clear.



PLEURA:

No pneumothorax or pleural fluid seen.



CARDIOVASCULAR:

Normal.



OSSEOUS STRUCTURES:

No significant abnormalities.



VISUALIZED UPPER ABDOMEN:

Normal.



OTHER FINDINGS:

Right-sided Port-A-Cath 



IMPRESSION:

No active disease.
0

## 2024-02-14 NOTE — PATIENT PROFILE ADULT - NSPROMEDSADMININFO_GEN_A_NUR
"ED Provider Note    Scribed for Corrine Rader M.D. by Amelia Quinones. 12/15/2022  7:25 PM    Means of arrival: EMS  History obtained from: Patient and EMS  History limited by: None      CHIEF COMPLAINT  Chief Complaint   Patient presents with    Loss of Consciousness     Pt found face down on carpet with vacuum running by family. Pt BG found to be 32. After 250cc D10, BG 38. Another 250cc D10 running       HPI  Poonam Mayo is a 60 y.o. female with past medical history of insulin-dependent diabetes, hypertension, hypothyroidism who presents to the Emergency Department via EMS for evaluation of altered mental status and loss of consciousness onset prior to arrival. Per EMS, patient was found face down on a carpeted floor with the vacuum running by family. Patient was unresponsive. EMS was then called. Per daughter, she called the patient at 11:40 AM this morning, and patient \"sounded great.\" Upon EMS arrival, patient had a BGL of 32. EMS treated patient with 250 cc of D10 to help alleviate her symptoms, which brennan the patient's blood sugar to 38. EMS started another 250 mg of D10 just prior to arrival however blood sugar initially on arrival here was in the 200s. Patient has associated incontinence of urine. Denies any recetn fevers. No alleviating or exacerbating factors reported. History of hypertension and hyperlipidemia, but daughter notes noncompliance with her medication. Denies history of seizures.  Daughter does give a history of methamphetamine abuse but states she does not use that frequently.  No history of alcohol use.  No known drug allergies.     REVIEW OF SYSTEMS  Pertinent positive include loss of consciousness and incontinence of urine. Pertinent negative include fevers. All other systems reviewed and are negative.    PAST MEDICAL HISTORY   has a past medical history of 250.01 (1996), Dupuytren contracture, HTN (hypertension) (3/18/2011), Hyperlipidemia, Hypertension, and " "Hypothyroid.    SOCIAL HISTORY  Social History     Tobacco Use    Smoking status: Every Day     Packs/day: 0.10     Years: 40.00     Pack years: 4.00     Types: Cigarettes     Last attempt to quit: 2016     Years since quittin.5    Smokeless tobacco: Never    Tobacco comments:     4 cigs a day   Vaping Use    Vaping Use: Never used   Substance and Sexual Activity    Alcohol use: Yes     Comment: occasional    Drug use: No    Sexual activity: Never       SURGICAL HISTORY   has a past surgical history that includes tubal coagulation laparoscopic bilateral (); appendectomy; and tonsillectomy.    CURRENT MEDICATIONS  Current Outpatient Medications   Medication Instructions    aspirin (ASA) 325 mg, Oral, EVERY 6 HOURS PRN    atorvastatin (LIPITOR) 10 mg, Oral, DAILY    HUMALOG 100 UNIT/ML INJECT 10 UNITS UNDER THE SKIN 3 TIMES A DAY BEFORE MEALS.    HUMALOG 100 UNIT/ML INJECT 10 UNITS UNDER THE SKIN 3 TIMES A DAY BEFORE MEALS.    insulin glargine (LANTUS) 40 Units, Subcutaneous, EVERY DAY, AS INSTRUCTED    levothyroxine (SYNTHROID) 112 mcg, Oral, EVERY DAY    lisinopril (PRINIVIL) 5 mg, Oral, DAILY       ALLERGIES  No Known Allergies    PHYSICAL EXAM   VITAL SIGNS: Pulse (!) 247   Resp (!) 26   Ht 1.651 m (5' 5\")   Wt 58 kg (127 lb 13.9 oz)   LMP 2011   SpO2 98%   BMI 21.28 kg/m²    Constitutional: Unresponsive ill appearing older female. Breathing on her own and protecting her airway.  HENT: Normocephalic, Atraumatic. Bilateral external ears normal. Nose normal.  Moist mucous membranes.  Oropharynx clear.  Eyes: Pupils are 5mm equal and reactive. Conjunctiva normal.   Neck: Supple, full range of motion  Heart: Regular rate and rhythm.  No murmurs.    Lungs: No respiratory distress, normal work of breathing.  Lungs clear to auscultation bilaterally.  Abdomen Soft, no distention.  No tenderness to palpation.  Musculoskeletal: Atraumatic. No obvious deformities noted.  No lower extremity " edema.  Skin: Cool to touch, Dry.  No erythema, No rash.   Neurologic: Unresponsive. Slightly responsive to painful stimuli. She has intermittent episodes of stiffening of extremities and possibly posturing, but no generalized tonic clonic movement   Psychiatric: Unable to assess.    DIAGNOSTIC STUDIES    EKG  Results for orders placed or performed during the hospital encounter of 12/15/22   EKG (Now)   Result Value Ref Range    Report       [facility] Emergency Dept.    Test Date:  2022-12-15  Pt Name:    PAMELA CENTENO                  Department: ER  MRN:        [id number]                      Room:        10  Gender:     Female                       Technician: [id number]  :        1962                   Requested By:CORRINE CROWELL  Order #:    [id number]                    Reading MD: Corrine Crowell MD    Measurements  Intervals                                Axis  Rate:       97                           P:          81  AR:         169                          QRS:        -71  QRSD:       93                           T:          78  QT:         404  QTc:        513    Interpretive Statements  Sinus rhythm  Anteroseptal infarct, age indeterminate  Prolonged QT interval  No ST change  Compared to ECG 04/10/2022 21:45:32  Prolonged QT interval now present  Myocardial infarct finding still present  Electronically Signed On 12- 20:54:14 PST by Corrine Crowell MD             LABS  Personally reviewed by me  Labs Reviewed   CBC WITH DIFFERENTIAL - Abnormal; Notable for the following components:       Result Value    MCHC 32.6 (*)     MPV 8.6 (*)     All other components within normal limits    Narrative:     Biotin intake of greater than 5 mg per day may interfere with  troponin levels, causing false low values.   COMP METABOLIC PANEL - Abnormal; Notable for the following components:    Sodium 133 (*)     Potassium 3.3 (*)     Glucose 460 (*)     Calcium 8.3 (*)     All other components  within normal limits    Narrative:     Biotin intake of greater than 5 mg per day may interfere with  troponin levels, causing false low values.   TROPONIN - Abnormal; Notable for the following components:    Troponin T 46 (*)     All other components within normal limits    Narrative:     Biotin intake of greater than 5 mg per day may interfere with  troponin levels, causing false low values.   LACTIC ACID - Abnormal; Notable for the following components:    Lactic Acid 2.7 (*)     All other components within normal limits    Narrative:     Biotin intake of greater than 5 mg per day may interfere with  troponin levels, causing false low values.   URINALYSIS,CULTURE IF INDICATED - Abnormal; Notable for the following components:    Character Cloudy (*)     Glucose 250 (*)     Protein 300 (*)     Leukocyte Esterase Trace (*)     Occult Blood Trace (*)     All other components within normal limits    Narrative:     Indication for culture:->Patient WITHOUT an indwelling Catalan  catheter in place with new onset of Dysuria, Frequency,  Urgency, and/or Suprapubic pain   URINE DRUG SCREEN - Abnormal; Notable for the following components:    Amphetamines Urine Positive (*)     All other components within normal limits   CORRECTED CALCIUM - Abnormal; Notable for the following components:    Correct Calcium 8.3 (*)     All other components within normal limits    Narrative:     Biotin intake of greater than 5 mg per day may interfere with  troponin levels, causing false low values.   SALICYLATE - Abnormal; Notable for the following components:    Salicylates, Quant. <1.0 (*)     All other components within normal limits   ACETAMINOPHEN - Abnormal; Notable for the following components:    Acetaminophen -Tylenol <5.0 (*)     All other components within normal limits   URINE MICROSCOPIC (W/UA) - Abnormal; Notable for the following components:    WBC 5-10 (*)     Bacteria Many (*)     All other components within normal limits     Narrative:     Indication for culture:->Patient WITHOUT an indwelling Catalan  catheter in place with new onset of Dysuria, Frequency,  Urgency, and/or Suprapubic pain   POCT ARTERIAL BLOOD GAS DEVICE RESULTS - Abnormal; Notable for the following components:    Ph 7.364 (*)     Pco2 52.6 (*)     Po2 115 (*)     Hco3 30.0 (*)     All other components within normal limits   POCT GLUCOSE DEVICE RESULTS - Abnormal; Notable for the following components:    POC Glucose, Blood 254 (*)     All other components within normal limits   DIAGNOSTIC ALCOHOL   ESTIMATED GFR    Narrative:     Biotin intake of greater than 5 mg per day may interfere with  troponin levels, causing false low values.   TRIGLYCERIDE    Narrative:     Biotin intake of greater than 5 mg per day may interfere with  troponin levels, causing false low values.   MAGNESIUM    Narrative:     Biotin intake of greater than 5 mg per day may interfere with  troponin levels, causing false low values.   TSH WITH REFLEX TO FT4   TROPONIN   HEMOGLOBIN A1C   POCT GLUCOSE DEVICE RESULTS   POCT GLUCOSE DEVICE RESULTS   POCT GLUCOSE DEVICE RESULTS   POCT GLUCOSE DEVICE RESULTS   POCT GLUCOSE DEVICE RESULTS   POCT GLUCOSE DEVICE RESULTS       RADIOLOGY  Personally reviewed by me  DX-CHEST-PORTABLE (1 VIEW)   Final Result         1.  No acute cardiopulmonary disease.   2.  Bilateral lower lobe nodular densities, location and appearance favors nipple shadows. Could be definitively characterized with repeat chest x-ray with nipple markers to exclude pulmonary nodule.   3.  Atherosclerosis      CT-HEAD W/O   Final Result      No acute intracranial abnormality.             PROCEDURES  Intubation Procedure Note    Indication: airway compromise    Consent: Unable to be obtained due to the emergent nature of this procedure however discussed with daughter    Medications Used: propofol intravenously and rocuronium intravenously    Procedure: The patient was placed in the appropriate  position.  Cricoid pressure was not required.  Intubation was performed by direct laryngoscopy using a laryngoscope and a 7.5 cuffed endotracheal tube.  The cuff was then inflated and the tube was secured appropriately at a distance of 21 cm to the dental ridge.  Initial confirmation of placement included bilateral breath sounds and an end tidal CO2 detector.  A chest x-ray to verify correct placement of the tube showed appropriate tube position.    The patient tolerated the procedure well.     Complications: None      ED COURSE  Vitals:    12/15/22 2221 12/15/22 2231 12/15/22 2246 12/15/22 2251   BP: (!) 82/52 (!) 91/55 100/66 105/67   Pulse: 78 74 (!) 128 79   Resp: (!) 23 (!) 27 (!) 25 (!) 26   SpO2: 97% 92% 94% 94%   Weight:       Height:             Medications administered:  Medications   LORAZEPAM 2 MG PO TABS (0 mg  Held 12/15/22 1945)   propofol (DIPRIVAN) injection (5 mcg/kg/min × 57 kg (Ideal) Intravenous Rate Change 12/15/22 2224)   dextrose 10% infusion ( Intravenous Rate Change 12/15/22 2253)   senna-docusate (PERICOLACE or SENOKOT S) 8.6-50 MG per tablet 2 Tablet (has no administration in time range)     And   polyethylene glycol/lytes (MIRALAX) PACKET 1 Packet (has no administration in time range)     And   magnesium hydroxide (MILK OF MAGNESIA) suspension 30 mL (has no administration in time range)     And   bisacodyl (DULCOLAX) suppository 10 mg (has no administration in time range)   heparin injection 5,000 Units (has no administration in time range)   LORAZEPAM 2 MG/ML INJ SOLN (2 mg  Given 12/15/22 1946)   propofol (DIPRIVAN) 20mL vial injection (RWN) (60 mg Intravenous Given 12/15/22 2011)   rocuronium (ZEMURON) injection 60 mg (60 mg Intravenous Given 12/15/22 2013)   levETIRAcetam (Keppra) injection 1,740 mg (1,740 mg Intravenous Given 12/15/22 2107)   magnesium sulfate IVPB premix 2 g (0 g Intravenous Stopped 12/15/22 2224)   dextrose 10 % BOLUS 250 mL (0 mL Intravenous Stopped 12/15/22  1595)   lactated ringers (LR) bolus (1,000 mL Intravenous New Bag 12/15/22 5064)         Old records personally reviewed:  Patient's last admission was in April for DKA.  Reviewed medications.  The patient takes Lantus and Humalog for insulin in addition to aspirin, levothyroxine, lisinopril    7:25 PM Patient seen and examined at bedside. Daughter at bedside. I discussed with the daughter the plan of care, which includes treating the patient with medication for their symptoms, as well as obtaining lab work and imaging for further evaluation. I also informed the daughter that because the patient is not responsive, we may have to place her on a ventilator to ensue she remains breathing. Daughter understands and verbalizes agreement to plan of care. Ordered for DX-chest, EKG, CT-head without, CBC with diff, CMP, Troponin, Lactic acid, and UA culture to evaluate. Patient will be treated with Lorazepam 2 mg for her symptoms.     8:00 PM - Patient intubated due to persistent unresponsiveness and posturing.    MEDICAL DECISION MAKING  Patient with history of insulin-dependent diabetes who presents after she was found unresponsive by her family members this evening.  Blood sugar was in the 30s when EMS arrived and she received 1 D10 bolus prior to arrival.  On arrival here, the patient remains completely unresponsive even to painful stimuli.  She does however have normal vitals and is breathing on her own and protecting her airway without hypoxia.  There is no evidence of trauma on exam.  The patient is having some stiffening in her extremities that appears to be posturing.  No generalized tonic-clonic seizure activity however her extremities are all very stiff.  Initially attempted Ativan to help with this however really did not see significant change.     Blood sugar at this time dropped again into the 50s therefore a second D10 bolus was given.  Mental status following this continued to be unchanged therefore decision  was made to intubate the patient for airway protection and as emergent imaging needed to be performed.  CT head negative for intracranial hemorrhage or mass.  EKG showed a significantly prolonged QT however no other findings of arrhythmia or ischemia.  She was given magnesium for prolonged QT and possible syncope.  She was also given Keppra load for possible subclinical seizures, likely will need continuous EEG.  Labs were relatively reassuring other than mildly elevated lactate which I would expect to be higher if the patient was having subclinical seizures.  No significant other electrolyte abnormalities.  Alcohol level negative, drug screen did return positive for amphetamines.  Tylenol and aspirin levels pending at this time.  No history to suggest intentional ingestion and the patient is not on any other oral antihyperglycemics.  Blood sugar did drop into the 40s following second bolus of D10 therefore D10 drip was initiated.    10:30 PM - Upon reassessment, patient continues to have stable vital signs at this time.  Currently sedated on propofol drip.  I updated the patient's family on results and plan of care for admission to the ICU they are agreeable at this time.    I discussed the case with Dr. Miller, intensivist on-call who accepts admission of the patient.    CRITICAL CARE TIME  Upon my evaluation, this patient had a high probability of imminent or life-threatening deterioration due to severe hypoglycemia requiring D10 drip, acute encephalopathy requiring intubation for airway protection which required my direct attention, intervention, and personal management.     I personally provided 48 minutes of total critical care time outside of time spent on separately billable/documented procedures. Time includes: review of laboratory data, review of radiology studies, discussion with consultants, discussion with family/patient, monitoring for potential decompensation.  Interventions were performed as  documented above.       DISPOSITION:  Patient will be hospitalized by Dr. Pool in guarded condition.    IMPRESSION  (G93.40) Acute encephalopathy  (E16.2) Hypoglycemia  (J96.01) Acute respiratory failure with hypoxia (HCC)  Intubation Procedure    The patient is referred to a primary physician for blood pressure management, diabetic screening, and for all other preventative health concerns.       Amelia MEADOWS (Scribe), am scribing for, and in the presence of, Corrine Rader M.D..    Electronically signed by: Amelia Quinones (Scribe), 12/15/2022    Corrine MEADOWS M.D. personally performed the services described in this documentation, as scribed by Amelia Quinones in my presence, and it is both accurate and complete.    The note accurately reflects work and decisions made by me.  Corrine Rader M.D.  12/15/2022  11:18 PM       no concerns

## 2024-02-14 NOTE — CARE COORDINATION ASSESSMENT. - NSDCPLANSERVICES_GEN_ALL_CORE
CM met with patient and his wife Eve  at bedside.  Patient agreeable to discuss transition of care with his wife in the room. Patient. A&O x 4. Pt s/p  PROCEDURES: Total anterior right hip replacement.   Pt  resting comfortably / Pt has no complaints at this time.  CM explained role of CM and availability to assist with discharge planning throughout stay.   Provided CM contact information and pt. verbalized understanding. Patient lives in a private house with his wife, 3/4 steps to navigate. Prior to surgery patient was ind in adls, works part time virtually. Patient identified his wife Eve  as his caregiver at home who will assist him during his recuperation and will transport him home and to MD appointments.   Pt. is agreeable with PT/OT's recommendations for d/c plan to home with HC/PT.  CM explained home care expectations, process, insurance provisions and home safety with good understanding.   Offered list of [facility] and pt. chose [facility] at home care agency.  Provided discharge resources folder. CM made referral accordingly.  Informed them of Anticipated  DC date for 2/15/2024 and for SOC 2/16/2024.  Patient provided with info on the transitional care management/health solutions team who will be following him upon DC.     Pt verbalizing understanding and in agreement with d/c plans.  DME: Pt has a RW, commode at home.  PCP: Dr Hernandez/ Cardiologist Dr Jones in [facility]  Pharm: [facility] [phone]  Pt stated he will be receiving meds to bed from [facility] pharmacy prior to DC./Home Care

## 2024-02-14 NOTE — CONSULT NOTE ADULT - ASSESSMENT
68M with R hip OA s/p R THR    POD#0 R THR   Multimodal analgesia   Bowel regimen   PT/OT evaluations   VTE ppx    Incentive spirometry    Obtain basic labs     xarelto   propranolol  68M with AF (on xarelto) and R hip OA s/p R THR    POD#0 R THR   Multimodal analgesia   Bowel regimen   PT/OT evaluations   VTE ppx    Incentive spirometry    Obtain basic labs     pAF   Resume xarelto 20mg qd when appropriate from orthopedic standpoint  Continue propranolol 5mg qd

## 2024-02-14 NOTE — DISCHARGE NOTE PROVIDER - PROVIDER TOKENS
PROVIDER:[TOKEN:[[id number]],FOLLOWUP:[2 weeks]] PROVIDER:[TOKEN:[[id number]],SCHEDULEDAPPT:[03/01/2024],SCHEDULEDAPPTTIME:[10:30 AM],ESTABLISHEDPATIENT:[T]]

## 2024-02-14 NOTE — DISCHARGE NOTE PROVIDER - NSDCMRMEDTOKEN_GEN_ALL_CORE_FT
mupirocin 2% topical ointment: 1 application in each nostril 2 times a day  propranolol 10 mg oral tablet: 1 tab(s) orally once a day  Xarelto 10 mg oral tablet: 1 tab(s) orally 2 times a day   celecoxib 100 mg oral capsule: 1 cap(s) orally every 12 hours  Eliquis 2.5 mg oral tablet: 1 tab(s) orally every 12 hours take in 2/15/24.  Then start eliqis 5 mg every 12 hrs for 14 days.  Eliquis 5 mg oral tablet: 1 tab(s) orally every 12 hours start on 2/16/24  oxyCODONE 5 mg oral tablet: 1 tab(s) orally every 4 hours as needed for  moderate pain or 2 tabs every 4 hrs as needed for severe pain  [id number] MDD: 6  propranolol 10 mg oral tablet: 1 tab(s) orally once a day  Xarelto 10 mg oral tablet: 1 tab(s) orally 2 times a day resume AFTER eliquis course is completed   acetaminophen 500 mg oral tablet: 2 tab(s) orally every 8 hours  celecoxib 100 mg oral capsule: 1 cap(s) orally every 12 hours  Eliquis 2.5 mg oral tablet: 1 tab(s) orally every 12 hours take in 2/15/24.  Then start eliqis 5 mg every 12 hrs for 14 days.  Eliquis 5 mg oral tablet: 1 tab(s) orally every 12 hours start on 2/16/24  omeprazole 20 mg oral delayed release capsule: 1 cap(s) orally once a day  oxyCODONE 5 mg oral tablet: 1 tab(s) orally every 4 hours as needed for  moderate pain or 2 tabs every 4 hrs as needed for severe pain  [id number] MDD: 6  polyethylene glycol 3350 oral powder for reconstitution: 17 gram(s) orally once a day (at bedtime)  propranolol 10 mg oral tablet: 1 tab(s) orally once a day  senna leaf extract oral tablet: 2 tab(s) orally once a day (at bedtime)  Xarelto 10 mg oral tablet: 1 tab(s) orally 2 times a day resume AFTER eliquis course is completed

## 2024-02-15 VITALS — SYSTOLIC BLOOD PRESSURE: 148 MMHG | HEART RATE: 73 BPM | DIASTOLIC BLOOD PRESSURE: 92 MMHG

## 2024-02-15 LAB
ANION GAP SERPL CALC-SCNC: 9 MMOL/L — SIGNIFICANT CHANGE UP (ref 5–17)
BUN SERPL-MCNC: 8 MG/DL — SIGNIFICANT CHANGE UP (ref 7–23)
CALCIUM SERPL-MCNC: 9.2 MG/DL — SIGNIFICANT CHANGE UP (ref 8.4–10.5)
CHLORIDE SERPL-SCNC: 99 MMOL/L — SIGNIFICANT CHANGE UP (ref 96–108)
CO2 SERPL-SCNC: 24 MMOL/L — SIGNIFICANT CHANGE UP (ref 22–31)
CREAT SERPL-MCNC: 0.56 MG/DL — SIGNIFICANT CHANGE UP (ref 0.5–1.3)
EGFR: 106 ML/MIN/1.73M2 — SIGNIFICANT CHANGE UP
GLUCOSE SERPL-MCNC: 129 MG/DL — HIGH (ref 70–99)
HCT VFR BLD CALC: 33.3 % — LOW (ref 39–50)
HGB BLD-MCNC: 12 G/DL — LOW (ref 13–17)
MCHC RBC-ENTMCNC: 32.4 PG — SIGNIFICANT CHANGE UP (ref 27–34)
MCHC RBC-ENTMCNC: 36 GM/DL — SIGNIFICANT CHANGE UP (ref 32–36)
MCV RBC AUTO: 90 FL — SIGNIFICANT CHANGE UP (ref 80–100)
NRBC # BLD: 0 /100 WBCS — SIGNIFICANT CHANGE UP (ref 0–0)
PLATELET # BLD AUTO: 202 K/UL — SIGNIFICANT CHANGE UP (ref 150–400)
POTASSIUM SERPL-MCNC: 5.1 MMOL/L — SIGNIFICANT CHANGE UP (ref 3.5–5.3)
POTASSIUM SERPL-SCNC: 5.1 MMOL/L — SIGNIFICANT CHANGE UP (ref 3.5–5.3)
RBC # BLD: 3.7 M/UL — LOW (ref 4.2–5.8)
RBC # FLD: 12.4 % — SIGNIFICANT CHANGE UP (ref 10.3–14.5)
SODIUM SERPL-SCNC: 132 MMOL/L — LOW (ref 135–145)
WBC # BLD: 13.99 K/UL — HIGH (ref 3.8–10.5)
WBC # FLD AUTO: 13.99 K/UL — HIGH (ref 3.8–10.5)

## 2024-02-15 PROCEDURE — 97161 PT EVAL LOW COMPLEX 20 MIN: CPT

## 2024-02-15 PROCEDURE — 97535 SELF CARE MNGMENT TRAINING: CPT

## 2024-02-15 PROCEDURE — 94664 DEMO&/EVAL PT USE INHALER: CPT

## 2024-02-15 PROCEDURE — 76000 FLUOROSCOPY <1 HR PHYS/QHP: CPT

## 2024-02-15 PROCEDURE — 85027 COMPLETE CBC AUTOMATED: CPT

## 2024-02-15 PROCEDURE — 27130 TOTAL HIP ARTHROPLASTY: CPT

## 2024-02-15 PROCEDURE — 85730 THROMBOPLASTIN TIME PARTIAL: CPT

## 2024-02-15 PROCEDURE — 97110 THERAPEUTIC EXERCISES: CPT

## 2024-02-15 PROCEDURE — 20985 CPTR-ASST DIR MS PX: CPT

## 2024-02-15 PROCEDURE — 97530 THERAPEUTIC ACTIVITIES: CPT

## 2024-02-15 PROCEDURE — C1776: CPT

## 2024-02-15 PROCEDURE — 99232 SBSQ HOSP IP/OBS MODERATE 35: CPT

## 2024-02-15 PROCEDURE — 80048 BASIC METABOLIC PNL TOTAL CA: CPT

## 2024-02-15 PROCEDURE — 97116 GAIT TRAINING THERAPY: CPT

## 2024-02-15 PROCEDURE — 85610 PROTHROMBIN TIME: CPT

## 2024-02-15 PROCEDURE — C1889: CPT

## 2024-02-15 PROCEDURE — 36415 COLL VENOUS BLD VENIPUNCTURE: CPT

## 2024-02-15 PROCEDURE — C1713: CPT

## 2024-02-15 PROCEDURE — 97165 OT EVAL LOW COMPLEX 30 MIN: CPT

## 2024-02-15 RX ADMIN — PANTOPRAZOLE SODIUM 40 MILLIGRAM(S): 20 TABLET, DELAYED RELEASE ORAL at 06:52

## 2024-02-15 RX ADMIN — CELECOXIB 100 MILLIGRAM(S): 200 CAPSULE ORAL at 10:23

## 2024-02-15 RX ADMIN — Medication 1000 MILLIGRAM(S): at 06:52

## 2024-02-15 RX ADMIN — Medication 101.6 MILLIGRAM(S): at 06:51

## 2024-02-15 RX ADMIN — Medication 1000 MILLIGRAM(S): at 06:51

## 2024-02-15 RX ADMIN — CELECOXIB 100 MILLIGRAM(S): 200 CAPSULE ORAL at 09:53

## 2024-02-15 RX ADMIN — Medication 1000 MILLIGRAM(S): at 14:16

## 2024-02-15 RX ADMIN — APIXABAN 2.5 MILLIGRAM(S): 2.5 TABLET, FILM COATED ORAL at 09:53

## 2024-02-15 RX ADMIN — Medication 1000 MILLIGRAM(S): at 13:46

## 2024-02-15 NOTE — PROGRESS NOTE ADULT - SUBJECTIVE AND OBJECTIVE BOX
Discharge medication calendar:  eliquis 2.5 mg q12h x 2 doses then eliquis 5 mg q12h x 2 weeks then resume home Xarelto  APAP 1000mg q8h x 2-3 weeks  Omeprazole 20mg QAM x 4 weeks  Celecoxib 100mg q12h x 2-3 weeks  Narcotic PRN  Docusate 100mg TID while taking narcotic  Miralax, Senna, or Bisacodyl PRN for treatment of constipation  
Post Op Day #1    SUBJECTIVE    67yo Male status post Right THR .   Patient is alert and comfortable.    Pain is controlled with current pain regimen.  Denies nausea, vomiting, chest pain, shortness of breath, abdominal pain or fever.   No new complaints.    OBJECTIVE    Vital Signs Last 24 Hrs  T(C): 36.4 (15 Feb 2024 08:00), Max: 36.8 (15 Feb 2024 03:30)  T(F): 97.6 (15 Feb 2024 08:00), Max: 98.3 (15 Feb 2024 03:30)  HR: 78 (15 Feb 2024 08:00) (68 - 93)  BP: 176/88 (15 Feb 2024 08:02) (119/50 - 180/90)  BP(mean): --  RR: 20 (15 Feb 2024 08:00) (13 - 20)  SpO2: 97% (15 Feb 2024 08:00) (96% - 100%)    Parameters below as of 15 Feb 2024 08:00  Patient On (Oxygen Delivery Method): room air      I&O's Summary    14 Feb 2024 07:01  -  15 Feb 2024 07:00  --------------------------------------------------------  IN: 1820 mL / OUT: 2150 mL / NET: -330 mL        PHYSICAL EXAM    Right Hip *dressing/ incision*  is clean, dry and intact.   No erythema/ No exudate/ No blistering/ No ecchymosis.   The calf is supple/nontender.   Sensation to light touch is grossly intact distally.   Motor function distally is intact.   No foot drop.   (2+) dorsalis pedis pulse. Capillary refill is less than 2 seconds. No cyanosis.                          12.0<L>  13.99<H> )-----------( 202      ( 15 Feb 2024 06:00 )             33.3<L>  15 Feb 2024 06:00    15 Feb 2024 06:00    132<L>  |  99     |  8      ----------------------------<  129<H>  5.1     |  24     |  0.56     Ca    9.2        15 Feb 2024 06:00        ASSESSMENT AND PLAN    - Orthopedically stable  - DVT prophylaxis: PAS + Eliquis 2.5mg every 12 hours today, then increase to Eliquis 5mg every 12 hours starting tomorrow  -  HO prophylaxis: Celebrex 100mg PO twice daily x21 days  - Continue physical therapy and occupational therapy  - Weight bearing as tolerated of the right lower extremity with assistance of a walker  - Incentive spirometry encouraged  - Pain control as clinically indicated  - Disposition: Home today if cleared by medicine and PT/OT       
Nose culture positive for MSSA. mupirocin ointment 2% escribed and sent to patient's pharmacy and to be used twice a day for 5 consecutive days. Treatment protocol reviewed with patient, questions answered and verbalized understanding. 
Ortho Post Op Check        Procedure: right ant MICHELLE  Surgeon: Santosh    Pt comfortable without complaints, pain controlled.  Unable to void-has pacheco  Denies CP, SOB, N/V, numbness/tingling   Pain Rx:  acetaminophen     Tablet .. 1000 milliGRAM(s) Oral every 8 hours  acetaminophen   IVPB .. 1000 milliGRAM(s) IV Intermittent once  celecoxib 100 milliGRAM(s) Oral every 12 hours  HYDROmorphone  Injectable 0.5 milliGRAM(s) IV Push every 3 hours PRN  ondansetron Injectable 4 milliGRAM(s) IV Push every 6 hours PRN  oxyCODONE    IR 10 milliGRAM(s) Oral every 3 hours PRN  oxyCODONE    IR 5 milliGRAM(s) Oral every 3 hours PRN      General Exam:  Vital Signs Last 24 Hrs  T(C): 36.6 (02-14-24 @ 14:10), Max: 36.6 (02-14-24 @ 14:10)  T(F): 97.9 (02-14-24 @ 14:10), Max: 97.9 (02-14-24 @ 14:10)  HR: 86 (02-14-24 @ 14:56) (66 - 93)  BP: 143/92 (02-14-24 @ 14:56) (119/50 - 146/78)  BP(mean): --  RR: 17 (02-14-24 @ 14:56) (13 - 20)  SpO2: 100% (02-14-24 @ 14:56) (99% - 100%)    General: Pt Alert and oriented, NAD, controlled pain.  Heart: RRR no murmur  Lungs:clear  Abdomen: BS+, soft  Right hip w/ silverlon clean and dry  Neuro/Vasc: Feet toes warm, pink. DP = 2+. No calf tenderness bilat.. Has sensation over feet & toes bilat. Full AROM bilat feet & toes. EHL = 5/5  VTEP: On Venodynes Bilat + Eliquis 2.5 -> 5 (xarelto at home for parox afib)  Walked 5' w/ PT earlier (spinal was still partially not resolved)    A/P: 68yMale POD#0 s/p right ant MICHELLE  - Stable  - Pain Control  - DVT ppx: eliquis  - Post op abx: ancef  - Weight bearing status: wbat  - d/c plan--home tomorrow  Opioid safety discussed w/ pt.  Do not keep opioid in the medicine cabinet in bathroom or on kitchen counter where others may have access to it.  Do not drive while taking opioid.  To dispose of it some pharmacies do have drop boxes as do police stations.  Do not flush or put in trash.

## 2024-02-15 NOTE — CONSULT NOTE ADULT - ASSESSMENT
68M with AF (on xarelto) and R hip OA s/p R THR    POD#1 R THR   Progressing well   Multimodal analgesia   Bowel regimen   PT/OT evaluations   VTE ppx    Incentive spirometry    Stable for dc from medical standpoint     Mild hyponatremia 132 likely due to variable intake periop  Encourage po intake  Consider repeat Na on pcp follow up    Mild leucocytosis  No need for further work up unless symptomatic    Mild acute blood loss anemia  Likely surgical blood loss  No clinical bleeding and HDS  Expectant management     pAF   Resume xarelto 20mg qd when appropriate from orthopedic standpoint  Continue propranolol 5mg qd

## 2024-02-15 NOTE — PHARMACOTHERAPY INTERVENTION NOTE - PRIMARY MEDICATION
Patient called stating he did not receive refill for zolpidem. Zolpidem called into Nichol ( pharmacist) at [facility].
Protocol failed, please advise on prescription request. LOV 09/06/17. Last RF 01/15.
rx called to pharmacy
omeprazole
rivaroxaban
propranolol

## 2024-02-15 NOTE — PHARMACOTHERAPY INTERVENTION NOTE - COMMENTS
Transition of Care video discharge education - medication calendar given to patient
Admission medication reconciliation POD1
Reached out to patient regarding 3 day interruption of their Xarelto prior to surgery date to ensure the patient can receive neuraxial anesthesia. Confirm patient last dose of xarelto is 2/10

## 2024-02-15 NOTE — CONSULT NOTE ADULT - SUBJECTIVE AND OBJECTIVE BOX
POD#1 R THR  Reports feeling well  Maki removed, voiding  Tolerating po  Partook with PT        REVIEW OF SYSTEMS:  CONSTITUTIONAL: No fever, weight loss, or fatigue    ENMT:  No difficulty hearing, tinnitus, vertigo; No sinus or throat pain    NECK: No pain or stiffness    RESPIRATORY: No cough, wheezing, chills or hemoptysis; No shortness of breath    CARDIOVASCULAR: No chest pain, palpitations, dizziness, or leg swelling    GASTROINTESTINAL: No abdominal or epigastric pain. No nausea, vomiting, or hematemesis; No diarrhea or constipation. No melena or hematochezia.    NEUROLOGICAL: No headaches, memory loss, loss of strength, numbness, or tremors    SKIN: No itching, burning, rashes, or lesions     LYMPH NODES: No enlarged glands    ENDOCRINE: No heat or cold intolerance; No hair loss    PSYCHIATRIC: No depression, anxiety, mood swings, or difficulty sleeping    HEME/LYMPH: No easy bruising, or bleeding gums    ALLERGY AND IMMUNOLOGIC: No hives or eczema      PAST MEDICAL & SURGICAL HISTORY:  HTN (hypertension)      Paroxysmal atrial fibrillation      Osteoarthritis of right hip      S/P bilateral inguinal hernia repair          SOCIAL HISTORY:  Residence: [ ] [facility]  [ ]   [ ] Community  [ ] Substance abuse:   [ ] Tobacco:   [ ] Alcohol use:     Allergies    No Known Allergies    Intolerances        MEDICATIONS  (STANDING):  celecoxib 100 milliGRAM(s) Oral every 12 hours  lactated ringers. 1000 milliLiter(s) (75 mL/Hr) IV Continuous <Continuous>    MEDICATIONS  (PRN):  HYDROmorphone  Injectable 0.2 milliGRAM(s) IV Push every 10 minutes PRN Moderate Pain (4 - 6)  HYDROmorphone  Injectable 0.5 milliGRAM(s) IV Push every 10 minutes PRN Severe Pain (7 - 10)      FAMILY HISTORY:  FH: heart attack (Father)    Family history of CLL in remission (Sibling)        Vital Signs Last 24 Hrs  T(C): 36.4 (14 Feb 2024 09:55), Max: 36.6 (14 Feb 2024 06:24)  T(F): 97.6 (14 Feb 2024 09:55), Max: 97.8 (14 Feb 2024 06:24)  HR: 71 (14 Feb 2024 11:40) (66 - 77)  BP: 144/78 (14 Feb 2024 11:40) (105/62 - 167/89)  BP(mean): --  RR: 17 (14 Feb 2024 11:40) (13 - 20)  SpO2: 99% (14 Feb 2024 11:40) (99% - 100%)    Parameters below as of 14 Feb 2024 10:25  Patient On (Oxygen Delivery Method): room air        PHYSICAL EXAM:  GENERAL: NAD   HEAD:  Atraumatic, Normocephalic    EYES: EOMI, PERRLA, conjunctiva and sclera clear    NERVOUS SYSTEM:  Alert & Oriented X3, Good concentration; Moving all 4 extremities; No gross sensory deficits    CHEST/LUNG: Clear to auscultation bilaterally; No rales, rhonchi, wheezing, or rubs    HEART: Regular rate and rhythm; No murmurs, rubs, or gallops    ABDOMEN: Soft, Nontender, Nondistended; Bowel sounds present    EXTREMITIES:  2+ Peripheral Pulses, No clubbing, cyanosis, or edema    INCISION R hip dressing clean and dry, neurovascularly intact     LABS:          PT/INR - ( 14 Feb 2024 06:20 )   PT: 11.5 sec;   INR: 1.06 ratio         PTT - ( 14 Feb 2024 06:20 )  PTT:33.0 sec    CAPILLARY BLOOD GLUCOSE          RADIOLOGY & ADDITIONAL STUDIES:    EKG:   Personally Reviewed:  [ ] YES     Imaging:   Personally Reviewed:  [ ] YES     Consultant(s) Notes Reviewed:      Care Discussed with Consultants/Other Providers:

## 2024-02-16 ENCOUNTER — TRANSCRIPTION ENCOUNTER (OUTPATIENT)
Age: 69
End: 2024-02-16

## 2024-02-20 PROBLEM — M16.11 UNILATERAL PRIMARY OSTEOARTHRITIS, RIGHT HIP: Chronic | Status: ACTIVE | Noted: 2024-01-25

## 2024-02-20 PROBLEM — I10 ESSENTIAL (PRIMARY) HYPERTENSION: Chronic | Status: ACTIVE | Noted: 2024-01-25

## 2024-03-01 ENCOUNTER — APPOINTMENT (OUTPATIENT)
Dept: ORTHOPEDIC SURGERY | Facility: CLINIC | Age: 69
End: 2024-03-01
Payer: MEDICARE

## 2024-03-01 VITALS — HEART RATE: 65 BPM | DIASTOLIC BLOOD PRESSURE: 87 MMHG | SYSTOLIC BLOOD PRESSURE: 158 MMHG

## 2024-03-01 PROCEDURE — 99024 POSTOP FOLLOW-UP VISIT: CPT

## 2024-03-01 PROCEDURE — 73502 X-RAY EXAM HIP UNI 2-3 VIEWS: CPT

## 2024-03-01 NOTE — HISTORY OF PRESENT ILLNESS
[___ Weeks Post Op] : [unfilled] weeks post op [Clean/Dry/Intact] : clean, dry and intact [Healed] : healed [Neuro Intact] : an unremarkable neurological exam [Vascular Intact] : ~T peripheral vascular exam normal [Doing Well] : is doing well [Negative Misty's] : maneuvers demonstrated a negative Misty's sign [No Sign of Infection] : is showing no signs of infection [Excellent Pain Control] : has excellent pain control [Chills] : no chills [Constipation] : no constipation [Diarrhea] : no diarrhea [Dysuria] : no dysuria [Fever] : no fever [Nausea] : no nausea [Vomiting] : no vomiting [Erythema] : not erythematous [Discharge] : absent of discharge [Swelling] : not swollen [Dehiscence] : not dehisced [de-identified] : s/p Right THR  2/14/2024 [de-identified] : Patient is a 68-year-old male who presents today for an evaluation regarding his right hip.  He is status post right total hip replacement on February 14, 2024.  Overall he states he has been doing very well and extremely happy following the progression of his surgery.  And states that he is slowly returning back to his previous activities he denies any history of new or recent injury. [de-identified] : On physical examination of the right hip. Patient is status post right total hip replacement. There is a well-healed surgical scar. There is no redness, swelling, heat, discharge or any other evidence of infection superficial or deep. Prineo tape is properly secured to the pt, and and removed without incident. There is some mild diffuse postoperative pain and tenderness most noticeable near the incision. Along with some resolving post op ecchymosis. The patient has good passive range of motion. Able to fully extend the hip, flexion to at least 90, adequate internal rotation, external rotation as well as adduction and abduction with minimal discomfort. The patient is able to actively flex the hip by performing a slight straight leg raise as well a abduct against resistance. There is no evidence of any limb length discrepancy. No evidence of any muscle atrophy. No evidence of any palpable defect. No evidence of any motor or sensory deficit. Patient has good distal pulses with no calf tenderness. Misty's test was negative. [de-identified] : X-rays of the right hip reveals status post right total hip replacement. In both views, the pelvis and right hip. The hardware is in place and shows excellent alignment as well as fixation. There is no evidence of limb length discrepancies. There is no evidence of any fracture, dislocation or loosening of the prosthesis. [de-identified] : Status post right total hip replacement from the anterior approach on February 14, 2024 [de-identified] : Plan for the patient is to continue with the current management.  This includes ice packs, moist heat, leg elevation and pain medications as needed. It is suggested that the pt continue with the physical therapy as well as exercises at home. A prescription was given to the patient.  This to include walking, isometric, range of motion and strengthening exercises.  The pt is also advised to continue with the anti-inflammatories to help with pain and swelling,but mostly to prevent heterotropic bone formation. The pt was also encouraged to continue with the prescribed blood thinners to help prevent a blood clot. These were originally prescribed when discharged from the hospital. The patient is also advised to return back to the office in another 4 weeks for reevaluation and x-rays.  However, if there is any increase in pain, redness, swelling, heat, discharge or fever. The patient is advised to notify the office, sooner.  I, Dr. Frost, personally performed the evaluation and management services for this established patient who presents today with an orthopedic condition. The evaluation and management includes conducting the conditions and establishing a plan of care.  Today, my ACP Nilo Conrad [facility], was here to assist with the patient in my evaluation and management services for this condition which will be followed going forward.  35 minutes were spent, face to face, in direct consultation with the patient. This includes reviewing the natural history of their Dx., eliciting the history, performing an orthopedic exam, review of the x-ray findings, forming a differential Dx and discussing all treatment options. This Includes both surgical and non-surgical treatments. I also reviewed all the risks and benefits of non-operative & operative Tx options, future impact into orthopedic functions/problems, activity restrictions both at home and at work, and all follow up requirements.

## 2024-03-01 NOTE — END OF VISIT
[Time Spent: ___ minutes] : I have spent [unfilled] minutes of time on the encounter. [FreeTextEntry3] : I, Dr. Bertrand Frost MD, personally performed the evaluation and management services for this established patient who presented today with a new problem/exacerbation of an existing condition. That E/M includes conducting the examination, assessing all new/exacerbated conditions, and establishing a new plan of care. Today, MY ACP was here to assist with recording the history in my evaluation and management services of this new problem/exacerbated condition to be followed going forward.

## 2024-03-06 ENCOUNTER — TRANSCRIPTION ENCOUNTER (OUTPATIENT)
Age: 69
End: 2024-03-06

## 2024-03-07 DIAGNOSIS — Z96.641 PRESENCE OF RIGHT ARTIFICIAL HIP JOINT: ICD-10-CM

## 2024-03-21 ENCOUNTER — TRANSCRIPTION ENCOUNTER (OUTPATIENT)
Age: 69
End: 2024-03-21

## 2024-04-12 ENCOUNTER — TRANSCRIPTION ENCOUNTER (OUTPATIENT)
Age: 69
End: 2024-04-12

## 2024-04-12 ENCOUNTER — APPOINTMENT (OUTPATIENT)
Dept: ORTHOPEDIC SURGERY | Facility: CLINIC | Age: 69
End: 2024-04-12
Payer: MEDICARE

## 2024-04-12 VITALS — SYSTOLIC BLOOD PRESSURE: 149 MMHG | HEART RATE: 64 BPM | DIASTOLIC BLOOD PRESSURE: 85 MMHG

## 2024-04-12 PROCEDURE — 73502 X-RAY EXAM HIP UNI 2-3 VIEWS: CPT

## 2024-04-12 PROCEDURE — 99024 POSTOP FOLLOW-UP VISIT: CPT

## 2024-04-12 NOTE — HISTORY OF PRESENT ILLNESS
[___ Months Post Op] : [unfilled] months post op [0] : no pain reported [Clean/Dry/Intact] : clean, dry and intact [Healed] : healed [Neuro Intact] : an unremarkable neurological exam [Vascular Intact] : ~T peripheral vascular exam normal [Negative Misty's] : maneuvers demonstrated a negative Misty's sign [Doing Well] : is doing well [Excellent Pain Control] : has excellent pain control [No Sign of Infection] : is showing no signs of infection [Chills] : no chills [Constipation] : no constipation [Diarrhea] : no diarrhea [Dysuria] : no dysuria [Fever] : no fever [Nausea] : no nausea [Vomiting] : no vomiting [Erythema] : not erythematous [Discharge] : absent of discharge [Swelling] : not swollen [Dehiscence] : not dehisced [de-identified] : s/p right THR 2/14/2024 [de-identified] : Patient is a 68-year-old male who presents today for an evaluation regarding his right hip.  He is status post right total hip replacement from the anterior approach on February 14, 2024.  Overall he states he is doing very well with less pain and discomfort.  He states that he is slowly returning back to his previous activities.  Including golfing.  He states that he does do physical therapy twice a week.  But also leads an active life.  He denies any history of new or recent injury.  States that the pain is 0 on a scale of 1-10 [de-identified] : On physical examination of the right hip.  Patient is status post right total hip replacement.  There is a well-healed surgical scar noted anteriorly.  There is no redness, swelling, heat, ecchymosis, discharge, pain or tenderness on examination.  The patient has excellent range of motion, passively, actively and against resistance, in all planes.  There is no evidence of limb length discrepancy.  No evidence of muscle atrophy. No palpable defect. No evidence of any motor or sensory deficit.  Patient has good distal pulses with no calf tenderness.  [de-identified] : X-rays of the right hip reveals status post right total hip replacement. In both views, the pelvis and right hip. The hardware is in place and shows excellent alignment as well as fixation. There is no evidence of limb length discrepancies. There is no evidence of any fracture, dislocation or loosening of the prosthesis. [de-identified] : Status post right total hip replacement from the anterior approach on February 14, 2024 [de-identified] : The patient was assured that they are progressing well post operatively. They were explained that the prosthesis is in perfect alignment, perfect placement and fixated well.  It was explained that they are progressing well and as expected. It is recommended that the pt continue with the current treatment plan. This includes an ongoing exercise program, ice/moist heat when needed and NSAID's when needed. It was explained that a good exercise routine will help with pain relief and improve the overall longevity of the prosthesis. The patient is advised to return to the office in another year or so for further evaluation and x-rays. However, if there is any increase in pain, redness, swelling, heat, discharge, fever, change in ambulation or pain. The patient is strongly advised to call the office sooner.  I, Dr. Frost, personally performed the evaluation and management services for this established patient who presents today with an orthopedic condition. The evaluation and management includes conducting the conditions and establishing a plan of care.  [facility] was here to assist with the patient in my evaluation and management services for this condition which will be followed going forward.  35 minutes were spent, face to face, in direct consultation with the patient. This includes reviewing the natural history of their Dx., eliciting the history, performing an orthopedic exam, review of the x-ray findings, forming a differential Dx and discussing all treatment options. This Includes both surgical and non-surgical treatments. I also reviewed all the risks and benefits of non-operative & operative Tx options, future impact into orthopedic functions/problems, activity restrictions both at home and at work, and all follow up requirements.

## 2025-03-14 ENCOUNTER — APPOINTMENT (OUTPATIENT)
Dept: ORTHOPEDIC SURGERY | Facility: CLINIC | Age: 70
End: 2025-03-14
Payer: MEDICARE

## 2025-03-14 DIAGNOSIS — Z96.641 PRESENCE OF RIGHT ARTIFICIAL HIP JOINT: ICD-10-CM

## 2025-03-14 PROCEDURE — 73502 X-RAY EXAM HIP UNI 2-3 VIEWS: CPT

## 2025-03-14 PROCEDURE — 99024 POSTOP FOLLOW-UP VISIT: CPT

## (undated) DEVICE — SYR LUER LOK 50CC

## (undated) DEVICE — ELCTR AQUAMANTYS BIPOLAR SEALER 6.0

## (undated) DEVICE — DRAPE XL SHEET 77X98"

## (undated) DEVICE — WOUND IRR IRRISEPT W 0.5 CHG

## (undated) DEVICE — DRAPE C ARM 41X140"

## (undated) DEVICE — SOL IRR POUR H2O 1500ML

## (undated) DEVICE — DRAPE MAYO STAND 30"

## (undated) DEVICE — HANDPIECE INTERPULSE W MULTI TIP

## (undated) DEVICE — VENODYNE/SCD SLEEVE CALF MEDIUM

## (undated) DEVICE — ELCTR ROCKER SWITCH PENCIL BLUE 10FT

## (undated) DEVICE — DRSG STOCKINETTE TUBULAR COTTON 2PLY 6X72"

## (undated) DEVICE — SAW BLADE STRYKER SAGITTAL AGGRESSIVE 25X86.5X1.32MM

## (undated) DEVICE — SOL IRR BAG NS 0.9% 3000ML

## (undated) DEVICE — DRSG DERMABOND PRINEO 60CM

## (undated) DEVICE — SUT QUILL PDO 0 36CM 36MM

## (undated) DEVICE — DRAPE TOP SHEET 53" X 101"

## (undated) DEVICE — SOL IRR POUR NS 0.9% 500ML

## (undated) DEVICE — WARMING BLANKET UPPER ADULT

## (undated) DEVICE — PACK TOTAL HIP

## (undated) DEVICE — POSITIONER POSITIONING ROLL  5X17"

## (undated) DEVICE — SUT STRATAFIX SPIRAL MONOCRYL PLUS 3-0 30CM PS-2 UNDYED

## (undated) DEVICE — DRSG COBAN 6"

## (undated) DEVICE — DRAPE SUPINE ESYSUIT

## (undated) DEVICE — HOOD FLYTE STRYKER HELMET SHIELD

## (undated) DEVICE — SUT POLYSORB 3-0 27" GS-11 UNDYED

## (undated) DEVICE — SUT VICRYL PLUS 1 27" CP UNDYED

## (undated) DEVICE — NDL SPINAL 18G X 3.5" (PINK)